# Patient Record
Sex: MALE | Race: WHITE | NOT HISPANIC OR LATINO | Employment: FULL TIME | URBAN - METROPOLITAN AREA
[De-identification: names, ages, dates, MRNs, and addresses within clinical notes are randomized per-mention and may not be internally consistent; named-entity substitution may affect disease eponyms.]

---

## 2023-06-12 ENCOUNTER — TELEPHONE (OUTPATIENT)
Dept: PRIMARY CARE | Facility: CLINIC | Age: 68
End: 2023-06-12
Payer: COMMERCIAL

## 2023-06-12 DIAGNOSIS — N52.9 ERECTILE DYSFUNCTION, UNSPECIFIED ERECTILE DYSFUNCTION TYPE: Primary | ICD-10-CM

## 2023-06-13 RX ORDER — SILDENAFIL 100 MG/1
100 TABLET, FILM COATED ORAL DAILY PRN
Qty: 30 TABLET | Refills: 2 | Status: SHIPPED | OUTPATIENT
Start: 2023-06-13 | End: 2024-05-03 | Stop reason: SDUPTHER

## 2023-07-14 PROBLEM — E55.9 VITAMIN D DEFICIENCY: Status: ACTIVE | Noted: 2023-07-14

## 2023-07-14 PROBLEM — E78.5 HYPERLIPIDEMIA: Status: ACTIVE | Noted: 2023-07-14

## 2023-07-14 PROBLEM — K40.90 RIGHT INGUINAL HERNIA: Status: ACTIVE | Noted: 2023-07-14

## 2023-07-14 PROBLEM — N40.0 BPH WITHOUT URINARY OBSTRUCTION: Status: ACTIVE | Noted: 2023-07-14

## 2023-07-14 PROBLEM — N52.9 ERECTILE DYSFUNCTION: Status: ACTIVE | Noted: 2023-07-14

## 2023-07-14 PROBLEM — I10 HYPERTENSION, ESSENTIAL: Status: ACTIVE | Noted: 2023-07-14

## 2023-07-14 PROBLEM — R53.83 FATIGUE: Status: ACTIVE | Noted: 2023-07-14

## 2023-07-14 RX ORDER — CLOBETASOL PROPIONATE 0.5 MG/G
CREAM TOPICAL
COMMUNITY
Start: 2020-11-03

## 2023-07-27 RX ORDER — SILDENAFIL 100 MG/1
TABLET, FILM COATED ORAL
Qty: 90 TABLET | Refills: 0 | OUTPATIENT
Start: 2023-07-27

## 2023-09-28 NOTE — TELEPHONE ENCOUNTER
Refill    Sildenafil Citrate 100 mg oral tablet (viagra), take 1 tablet daily 1 hour before needed    Pharm: [facility]   [phone]    LR: 8/18/22 30 days 5 refills  LV:  8/18/22  NV: 8/25/23   Albendazole Counseling:  I discussed with the patient the risks of albendazole including but not limited to cytopenia, kidney damage, nausea/vomiting and severe allergy.  The patient understands that this medication is being used in an off-label manner.

## 2024-04-30 ENCOUNTER — TELEPHONE (OUTPATIENT)
Dept: PRIMARY CARE | Facility: CLINIC | Age: 69
End: 2024-04-30
Payer: COMMERCIAL

## 2024-04-30 DIAGNOSIS — N52.9 ERECTILE DYSFUNCTION, UNSPECIFIED ERECTILE DYSFUNCTION TYPE: ICD-10-CM

## 2024-04-30 NOTE — TELEPHONE ENCOUNTER
Pt is calling asking if you can refill his Sildenafil until his appointment on 5/16/24?    REFILL  MEDICATION:     Sildenafil 100 MG; Take 1 tablet once daily as needed for erectile dysfunction.     PHARM: [facility]   PHARM NUMBER: [phone]    LR: 6/13/23     30 tablets with 2 refills   LV: 8/18/22  NV: 5/16/24

## 2024-05-03 RX ORDER — SILDENAFIL 100 MG/1
100 TABLET, FILM COATED ORAL DAILY PRN
Qty: 30 TABLET | Refills: 2 | Status: SHIPPED | OUTPATIENT
Start: 2024-05-03 | End: 2024-05-16 | Stop reason: SDUPTHER

## 2024-05-16 ENCOUNTER — OFFICE VISIT (OUTPATIENT)
Dept: PRIMARY CARE | Facility: CLINIC | Age: 69
End: 2024-05-16
Payer: COMMERCIAL

## 2024-05-16 VITALS
BODY MASS INDEX: 28.31 KG/M2 | OXYGEN SATURATION: 93 % | WEIGHT: 209 LBS | SYSTOLIC BLOOD PRESSURE: 168 MMHG | HEART RATE: 64 BPM | DIASTOLIC BLOOD PRESSURE: 108 MMHG | HEIGHT: 72 IN

## 2024-05-16 DIAGNOSIS — M54.31 SCIATICA OF RIGHT SIDE: ICD-10-CM

## 2024-05-16 DIAGNOSIS — E55.9 VITAMIN D DEFICIENCY: ICD-10-CM

## 2024-05-16 DIAGNOSIS — E29.1 HYPOGONADISM MALE: ICD-10-CM

## 2024-05-16 DIAGNOSIS — R39.198 SLOWING OF URINARY STREAM: ICD-10-CM

## 2024-05-16 DIAGNOSIS — I10 HYPERTENSION, ESSENTIAL: Primary | ICD-10-CM

## 2024-05-16 DIAGNOSIS — Z00.00 WELCOME TO MEDICARE PREVENTIVE VISIT: ICD-10-CM

## 2024-05-16 DIAGNOSIS — E78.5 HYPERLIPIDEMIA, UNSPECIFIED HYPERLIPIDEMIA TYPE: ICD-10-CM

## 2024-05-16 DIAGNOSIS — N40.0 BENIGN PROSTATIC HYPERPLASIA, UNSPECIFIED WHETHER LOWER URINARY TRACT SYMPTOMS PRESENT: ICD-10-CM

## 2024-05-16 DIAGNOSIS — S32.030D COMPRESSION FRACTURE OF L3 VERTEBRA WITH ROUTINE HEALING, SUBSEQUENT ENCOUNTER: ICD-10-CM

## 2024-05-16 DIAGNOSIS — N52.9 ERECTILE DYSFUNCTION, UNSPECIFIED ERECTILE DYSFUNCTION TYPE: ICD-10-CM

## 2024-05-16 PROCEDURE — G0403 EKG FOR INITIAL PREVENT EXAM: HCPCS | Performed by: FAMILY MEDICINE

## 2024-05-16 PROCEDURE — 1170F FXNL STATUS ASSESSED: CPT | Performed by: FAMILY MEDICINE

## 2024-05-16 PROCEDURE — 1160F RVW MEDS BY RX/DR IN RCRD: CPT | Performed by: FAMILY MEDICINE

## 2024-05-16 PROCEDURE — 3077F SYST BP >= 140 MM HG: CPT | Performed by: FAMILY MEDICINE

## 2024-05-16 PROCEDURE — 1159F MED LIST DOCD IN RCRD: CPT | Performed by: FAMILY MEDICINE

## 2024-05-16 PROCEDURE — 1036F TOBACCO NON-USER: CPT | Performed by: FAMILY MEDICINE

## 2024-05-16 PROCEDURE — 3080F DIAST BP >= 90 MM HG: CPT | Performed by: FAMILY MEDICINE

## 2024-05-16 PROCEDURE — 99215 OFFICE O/P EST HI 40 MIN: CPT | Performed by: FAMILY MEDICINE

## 2024-05-16 PROCEDURE — G0402 INITIAL PREVENTIVE EXAM: HCPCS | Performed by: FAMILY MEDICINE

## 2024-05-16 RX ORDER — AMLODIPINE BESYLATE 5 MG/1
5 TABLET ORAL DAILY
Qty: 90 TABLET | Refills: 2 | Status: SHIPPED | OUTPATIENT
Start: 2024-05-16 | End: 2025-02-10

## 2024-05-16 RX ORDER — METHOCARBAMOL 500 MG/1
500 TABLET, FILM COATED ORAL 3 TIMES DAILY PRN
Qty: 40 TABLET | Refills: 3 | Status: SHIPPED | OUTPATIENT
Start: 2024-05-16 | End: 2024-07-08

## 2024-05-16 RX ORDER — SILDENAFIL 100 MG/1
100 TABLET, FILM COATED ORAL DAILY PRN
Qty: 30 TABLET | Refills: 2 | Status: SHIPPED | OUTPATIENT
Start: 2024-05-16 | End: 2025-05-16

## 2024-05-16 ASSESSMENT — ACTIVITIES OF DAILY LIVING (ADL)
DOING_HOUSEWORK: INDEPENDENT
MANAGING_FINANCES: INDEPENDENT
GROCERY_SHOPPING: INDEPENDENT
DRESSING: INDEPENDENT
BATHING: INDEPENDENT

## 2024-05-16 ASSESSMENT — PATIENT HEALTH QUESTIONNAIRE - PHQ9
2. FEELING DOWN, DEPRESSED OR HOPELESS: NOT AT ALL
SUM OF ALL RESPONSES TO PHQ9 QUESTIONS 1 AND 2: 0
1. LITTLE INTEREST OR PLEASURE IN DOING THINGS: NOT AT ALL

## 2024-05-16 ASSESSMENT — ENCOUNTER SYMPTOMS
LOSS OF SENSATION IN FEET: 0
OCCASIONAL FEELINGS OF UNSTEADINESS: 0
DEPRESSION: 0

## 2024-05-16 NOTE — PATIENT INSTRUCTIONS
Hypertension: Discussed importance of good blood pressure control to avoid long-term complications such as heart attack and stroke.  Patient is aware that blood pressure goal is less than 130/80.  Maintaining a regular exercise program and body mass index (BMI) less than 25 as well as a diet lower in carbohydrates will help reach these goals.
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
## 2024-05-17 ENCOUNTER — LAB (OUTPATIENT)
Dept: LAB | Facility: LAB | Age: 69
End: 2024-05-17
Payer: COMMERCIAL

## 2024-05-17 ENCOUNTER — HOSPITAL ENCOUNTER (OUTPATIENT)
Dept: RADIOLOGY | Facility: CLINIC | Age: 69
Discharge: HOME | End: 2024-05-17
Payer: MEDICARE

## 2024-05-17 DIAGNOSIS — R53.83 FATIGUE, UNSPECIFIED TYPE: ICD-10-CM

## 2024-05-17 DIAGNOSIS — E78.5 HYPERLIPIDEMIA, UNSPECIFIED HYPERLIPIDEMIA TYPE: ICD-10-CM

## 2024-05-17 DIAGNOSIS — E29.1 HYPOGONADISM MALE: ICD-10-CM

## 2024-05-17 DIAGNOSIS — N40.0 BENIGN PROSTATIC HYPERPLASIA, UNSPECIFIED WHETHER LOWER URINARY TRACT SYMPTOMS PRESENT: ICD-10-CM

## 2024-05-17 DIAGNOSIS — M54.31 SCIATICA OF RIGHT SIDE: ICD-10-CM

## 2024-05-17 DIAGNOSIS — Z00.00 ENCOUNTER FOR HEALTH MAINTENANCE EXAMINATION: ICD-10-CM

## 2024-05-17 DIAGNOSIS — Z13.220 SCREENING, LIPID: ICD-10-CM

## 2024-05-17 DIAGNOSIS — R39.198 SLOWING OF URINARY STREAM: ICD-10-CM

## 2024-05-17 DIAGNOSIS — E55.9 VITAMIN D DEFICIENCY: ICD-10-CM

## 2024-05-17 DIAGNOSIS — I10 HYPERTENSION, ESSENTIAL: ICD-10-CM

## 2024-05-17 LAB
25(OH)D3 SERPL-MCNC: 25 NG/ML (ref 30–100)
ALBUMIN SERPL BCP-MCNC: 4.5 G/DL (ref 3.4–5)
ALP SERPL-CCNC: 74 U/L (ref 33–136)
ALT SERPL W P-5'-P-CCNC: 48 U/L (ref 10–52)
ANION GAP SERPL CALC-SCNC: 11 MMOL/L (ref 10–20)
APPEARANCE UR: CLEAR
AST SERPL W P-5'-P-CCNC: 35 U/L (ref 9–39)
BILIRUB SERPL-MCNC: 0.8 MG/DL (ref 0–1.2)
BILIRUB UR STRIP.AUTO-MCNC: NEGATIVE MG/DL
BUN SERPL-MCNC: 18 MG/DL (ref 6–23)
CALCIUM SERPL-MCNC: 9.5 MG/DL (ref 8.6–10.3)
CHLORIDE SERPL-SCNC: 104 MMOL/L (ref 98–107)
CHOLEST SERPL-MCNC: 165 MG/DL (ref 0–199)
CHOLESTEROL/HDL RATIO: 3.3
CO2 SERPL-SCNC: 27 MMOL/L (ref 21–32)
COLOR UR: YELLOW
CREAT SERPL-MCNC: 0.73 MG/DL (ref 0.5–1.3)
EGFRCR SERPLBLD CKD-EPI 2021: >90 ML/MIN/1.73M*2
ERYTHROCYTE [DISTWIDTH] IN BLOOD BY AUTOMATED COUNT: 13.2 % (ref 11.5–14.5)
GLUCOSE SERPL-MCNC: 89 MG/DL (ref 74–99)
GLUCOSE UR STRIP.AUTO-MCNC: NEGATIVE MG/DL
HCT VFR BLD AUTO: 48.7 % (ref 41–52)
HDLC SERPL-MCNC: 49.7 MG/DL
HGB BLD-MCNC: 16.3 G/DL (ref 13.5–17.5)
KETONES UR STRIP.AUTO-MCNC: NEGATIVE MG/DL
LDLC SERPL CALC-MCNC: 76 MG/DL
LEUKOCYTE ESTERASE UR QL STRIP.AUTO: NEGATIVE
MCH RBC QN AUTO: 28.2 PG (ref 26–34)
MCHC RBC AUTO-ENTMCNC: 33.5 G/DL (ref 32–36)
MCV RBC AUTO: 84 FL (ref 80–100)
NITRITE UR QL STRIP.AUTO: NEGATIVE
NON HDL CHOLESTEROL: 115 MG/DL (ref 0–149)
NRBC BLD-RTO: 0 /100 WBCS (ref 0–0)
PH UR STRIP.AUTO: 6 [PH]
PLATELET # BLD AUTO: 246 X10*3/UL (ref 150–450)
POTASSIUM SERPL-SCNC: 4.5 MMOL/L (ref 3.5–5.3)
PROT SERPL-MCNC: 7.7 G/DL (ref 6.4–8.2)
PROT UR STRIP.AUTO-MCNC: NEGATIVE MG/DL
PSA SERPL-MCNC: 1.54 NG/ML
RBC # BLD AUTO: 5.79 X10*6/UL (ref 4.5–5.9)
RBC # UR STRIP.AUTO: NEGATIVE /UL
SODIUM SERPL-SCNC: 137 MMOL/L (ref 136–145)
SP GR UR STRIP.AUTO: 1.02
TESTOST SERPL-MCNC: 780 NG/DL (ref 240–1000)
TRIGL SERPL-MCNC: 197 MG/DL (ref 0–149)
TSH SERPL-ACNC: 1.1 MIU/L (ref 0.44–3.98)
UROBILINOGEN UR STRIP.AUTO-MCNC: 2 MG/DL
VLDL: 39 MG/DL (ref 0–40)
WBC # BLD AUTO: 8 X10*3/UL (ref 4.4–11.3)

## 2024-05-17 PROCEDURE — 82306 VITAMIN D 25 HYDROXY: CPT

## 2024-05-17 PROCEDURE — 72100 X-RAY EXAM L-S SPINE 2/3 VWS: CPT | Performed by: RADIOLOGY

## 2024-05-17 PROCEDURE — 85027 COMPLETE CBC AUTOMATED: CPT

## 2024-05-17 PROCEDURE — 36415 COLL VENOUS BLD VENIPUNCTURE: CPT

## 2024-05-17 PROCEDURE — 84153 ASSAY OF PSA TOTAL: CPT

## 2024-05-17 PROCEDURE — 72100 X-RAY EXAM L-S SPINE 2/3 VWS: CPT

## 2024-05-17 PROCEDURE — 84443 ASSAY THYROID STIM HORMONE: CPT

## 2024-05-17 PROCEDURE — 80053 COMPREHEN METABOLIC PANEL: CPT

## 2024-05-17 PROCEDURE — 84403 ASSAY OF TOTAL TESTOSTERONE: CPT

## 2024-05-17 PROCEDURE — 81003 URINALYSIS AUTO W/O SCOPE: CPT

## 2024-05-17 PROCEDURE — 80061 LIPID PANEL: CPT

## 2024-05-22 ENCOUNTER — TELEPHONE (OUTPATIENT)
Dept: PRIMARY CARE | Facility: CLINIC | Age: 69
End: 2024-05-22
Payer: COMMERCIAL

## 2024-05-23 NOTE — TELEPHONE ENCOUNTER
I notified patient of his blood work results, but he also wants his XRAY results.     Dr. Schroeder,   Please see XRAY results then FWD to me to call patient.

## 2024-05-24 DIAGNOSIS — M54.42 ACUTE LEFT-SIDED LOW BACK PAIN WITH LEFT-SIDED SCIATICA: ICD-10-CM

## 2024-05-24 DIAGNOSIS — N20.0 NEPHROLITHIASIS: Primary | ICD-10-CM

## 2024-05-24 NOTE — RESULT ENCOUNTER NOTE
Left detailed VM notifying patient and asked that he call our office back to schedule with urology. I also mailed him a copy of the xray report as he requested.

## 2024-05-28 ENCOUNTER — TELEPHONE (OUTPATIENT)
Dept: PRIMARY CARE | Facility: CLINIC | Age: 69
End: 2024-05-28
Payer: COMMERCIAL

## 2024-05-28 NOTE — RESULT ENCOUNTER NOTE
Patient called back and spoke with Jenniffer. He has multiple questions for Dr. Schroeder. Can you please call him?

## 2024-07-22 DIAGNOSIS — N20.0 RENAL STONE: ICD-10-CM

## 2024-07-22 DIAGNOSIS — N40.0 BPH WITHOUT URINARY OBSTRUCTION: ICD-10-CM

## 2024-07-24 ENCOUNTER — HOSPITAL ENCOUNTER (OUTPATIENT)
Dept: RADIOLOGY | Facility: CLINIC | Age: 69
Discharge: HOME | End: 2024-07-24
Payer: MEDICARE

## 2024-07-24 DIAGNOSIS — N20.0 RENAL STONE: ICD-10-CM

## 2024-07-24 PROCEDURE — 74176 CT ABD & PELVIS W/O CONTRAST: CPT | Performed by: RADIOLOGY

## 2024-07-24 PROCEDURE — 74176 CT ABD & PELVIS W/O CONTRAST: CPT

## 2024-07-30 ENCOUNTER — TELEPHONE (OUTPATIENT)
Dept: PRIMARY CARE | Facility: CLINIC | Age: 69
End: 2024-07-30
Payer: MEDICARE

## 2024-07-30 NOTE — TELEPHONE ENCOUNTER
Patient states he has been having difficulty reaching Dr. Candelaria's office. He said he did a CT scan last week and still has not heard back. He states he is still in a considerable amount of pain and is feeling discouraged

## 2024-08-16 ENCOUNTER — APPOINTMENT (OUTPATIENT)
Dept: PRIMARY CARE | Facility: CLINIC | Age: 69
End: 2024-08-16
Payer: COMMERCIAL

## 2024-08-16 VITALS
OXYGEN SATURATION: 97 % | SYSTOLIC BLOOD PRESSURE: 171 MMHG | BODY MASS INDEX: 28.04 KG/M2 | HEIGHT: 72 IN | DIASTOLIC BLOOD PRESSURE: 91 MMHG | WEIGHT: 207 LBS | HEART RATE: 78 BPM

## 2024-08-16 DIAGNOSIS — M54.31 SCIATICA OF RIGHT SIDE: ICD-10-CM

## 2024-08-16 DIAGNOSIS — N20.0 RENAL CALCULUS, LEFT: ICD-10-CM

## 2024-08-16 DIAGNOSIS — I10 HYPERTENSION, ESSENTIAL: Primary | ICD-10-CM

## 2024-08-16 PROCEDURE — 3077F SYST BP >= 140 MM HG: CPT | Performed by: FAMILY MEDICINE

## 2024-08-16 PROCEDURE — 3008F BODY MASS INDEX DOCD: CPT | Performed by: FAMILY MEDICINE

## 2024-08-16 PROCEDURE — 1159F MED LIST DOCD IN RCRD: CPT | Performed by: FAMILY MEDICINE

## 2024-08-16 PROCEDURE — 1160F RVW MEDS BY RX/DR IN RCRD: CPT | Performed by: FAMILY MEDICINE

## 2024-08-16 PROCEDURE — 1036F TOBACCO NON-USER: CPT | Performed by: FAMILY MEDICINE

## 2024-08-16 PROCEDURE — 99214 OFFICE O/P EST MOD 30 MIN: CPT | Performed by: FAMILY MEDICINE

## 2024-08-16 PROCEDURE — 3080F DIAST BP >= 90 MM HG: CPT | Performed by: FAMILY MEDICINE

## 2024-08-16 ASSESSMENT — PATIENT HEALTH QUESTIONNAIRE - PHQ9
1. LITTLE INTEREST OR PLEASURE IN DOING THINGS: NOT AT ALL
2. FEELING DOWN, DEPRESSED OR HOPELESS: NOT AT ALL
SUM OF ALL RESPONSES TO PHQ9 QUESTIONS 1 & 2: 0

## 2024-08-16 NOTE — PROGRESS NOTES
Subjective     Patient ID: [id number]     Chuy Salazar is a 69 y.o. male who presents for 3 mo follow up on BP .    HPI    HTN: Pt is being prescribed Norvasc. Pt states that his BP has been elevated times 2 readings outside of the office. Pt did not do home BP monitoring due to not having a BP cuff.    His main concern this visit is continued low back and RLE pain:    - Xray of lumbar spine in 5/2024 showed old vertebral compression fractures of L1, L2, and L3 - all unchanged from previous Xrays.  Also with moderate lumbar DJD and a 6 mm left lower pole renal calculus.  - Pt has been prescribed Robaxin. Pt is using Robaxin at night only due to sedative effects. Pt takes 10 aleve in the AM before going to work. Pt is a .  He continues to work full time without restrictions.  - he describes the pain as 10/10.  Ambulation is painful.      Renal Stones: This was an incidental finding of CT. This condition is being managed by Dr. Brambila.    ROS:  denies headache, vision change, tinnitus, chest pain, SOB, B/B incontinence    Objective   BP (!) 171/91   Pulse 78   Ht 1.829 m (6')   Wt 93.9 kg (207 lb)   SpO2 97%   BMI 28.07 kg/m²      Physical Exam:     -Musculoskeletal: Positive patellar reflexes. Pt is unbalanced with heal and toe lifts. Pt favors RLE while ambulating. Sacral iliac tenderness with palpitation.  -CT of Abd/pelvis reviewed w/ pt.    Assessment/Plan     1. Hypertension, essential  - continue Norvasc 5 mg qd  -Lisinopril 10 mg po twice daily added to medication regimen this visit.  -Hypertension: Discussed importance of good blood pressure control to avoid long-term complications such as heart attack and stroke.  Patient is aware that blood pressure goal is less than 130/80.  Maintaining a regular exercise program and body mass index (BMI) less than 25 as well as a diet lower in carbohydrates will help reach these goals.   -Encouraged home BP monitoring.     2. Sciatica of right  side    - Referral to Pain Medicine; Future    3. Renal calculus, left    -Pt to follow up with Dr. Brambila the urologist.          Follow up in 3 months for medical management    I will continue to monitor, evaluate, assess and treat all problems/diagnoses as appropriate and continue to collaborate with specialists.    Contact office or send a  [facility] message with any questions or concerns    Encouraged to sign up with  [facility]  Patient will only be notified of labs that require medical intervention.    Prescriptions will not be filled unless you are compliant with your follow up appointments or have a follow up appointment scheduled as per instruction of your physician. Refills should be requested at the time of your visit.    **Charting was completed using voice recognition technology and may include unintended errors**    Documentation in part by Kathy De La Rosa RN, NP student, [facility]    I saw and evaluated the patient. I personally obtained the key and critical portions of the history and physical exam or was physically present for key and critical portions performed by the nurse practitioner student. I reviewed the documentation and discussed the patient with the nurse practitioner student.  I was directly involved with the history, exam and medical decision making and agree with the medical decision making as documented in the note.      Kris Schroeder DO, JACOB  Senior Attending Physician  [facility]  [id number]  [place] [address] [phone]        Kathy De La Rosa

## 2024-08-18 RX ORDER — LISINOPRIL 10 MG/1
20 TABLET ORAL DAILY
Qty: 90 TABLET | Refills: 2 | Status: SHIPPED | OUTPATIENT
Start: 2024-08-18 | End: 2025-01-15

## 2024-08-22 ENCOUNTER — OFFICE VISIT (OUTPATIENT)
Dept: PAIN MEDICINE | Facility: CLINIC | Age: 69
End: 2024-08-22
Payer: MEDICARE

## 2024-08-22 VITALS
HEART RATE: 61 BPM | HEIGHT: 72 IN | RESPIRATION RATE: 18 BRPM | WEIGHT: 207 LBS | SYSTOLIC BLOOD PRESSURE: 127 MMHG | BODY MASS INDEX: 28.04 KG/M2 | OXYGEN SATURATION: 94 % | TEMPERATURE: 98.2 F | DIASTOLIC BLOOD PRESSURE: 80 MMHG

## 2024-08-22 DIAGNOSIS — M54.40 CHRONIC LOW BACK PAIN WITH SCIATICA, SCIATICA LATERALITY UNSPECIFIED, UNSPECIFIED BACK PAIN LATERALITY: ICD-10-CM

## 2024-08-22 DIAGNOSIS — M54.31 SCIATICA OF RIGHT SIDE: ICD-10-CM

## 2024-08-22 DIAGNOSIS — G89.29 CHRONIC LOW BACK PAIN WITH SCIATICA, SCIATICA LATERALITY UNSPECIFIED, UNSPECIFIED BACK PAIN LATERALITY: ICD-10-CM

## 2024-08-22 DIAGNOSIS — M54.17 LUMBOSACRAL RADICULOPATHY AT L5: Primary | ICD-10-CM

## 2024-08-22 PROCEDURE — 1036F TOBACCO NON-USER: CPT | Performed by: ANESTHESIOLOGY

## 2024-08-22 PROCEDURE — 1125F AMNT PAIN NOTED PAIN PRSNT: CPT | Performed by: ANESTHESIOLOGY

## 2024-08-22 PROCEDURE — 99204 OFFICE O/P NEW MOD 45 MIN: CPT | Performed by: ANESTHESIOLOGY

## 2024-08-22 PROCEDURE — 3008F BODY MASS INDEX DOCD: CPT | Performed by: ANESTHESIOLOGY

## 2024-08-22 PROCEDURE — 1159F MED LIST DOCD IN RCRD: CPT | Performed by: ANESTHESIOLOGY

## 2024-08-22 PROCEDURE — 3074F SYST BP LT 130 MM HG: CPT | Performed by: ANESTHESIOLOGY

## 2024-08-22 PROCEDURE — 99214 OFFICE O/P EST MOD 30 MIN: CPT | Performed by: ANESTHESIOLOGY

## 2024-08-22 PROCEDURE — 3079F DIAST BP 80-89 MM HG: CPT | Performed by: ANESTHESIOLOGY

## 2024-08-22 RX ORDER — GABAPENTIN 300 MG/1
300 CAPSULE ORAL 3 TIMES DAILY
Qty: 90 CAPSULE | Refills: 1 | Status: SHIPPED | OUTPATIENT
Start: 2024-08-22 | End: 2024-09-21

## 2024-08-22 RX ORDER — MELOXICAM 15 MG/1
15 TABLET ORAL DAILY
Qty: 90 TABLET | Refills: 0 | Status: SHIPPED | OUTPATIENT
Start: 2024-08-22 | End: 2024-11-20

## 2024-08-22 ASSESSMENT — ENCOUNTER SYMPTOMS
DEPRESSION: 0
LOSS OF SENSATION IN FEET: 1
OCCASIONAL FEELINGS OF UNSTEADINESS: 1

## 2024-08-22 ASSESSMENT — COLUMBIA-SUICIDE SEVERITY RATING SCALE - C-SSRS
2. HAVE YOU ACTUALLY HAD ANY THOUGHTS OF KILLING YOURSELF?: NO
6. HAVE YOU EVER DONE ANYTHING, STARTED TO DO ANYTHING, OR PREPARED TO DO ANYTHING TO END YOUR LIFE?: NO
1. IN THE PAST MONTH, HAVE YOU WISHED YOU WERE DEAD OR WISHED YOU COULD GO TO SLEEP AND NOT WAKE UP?: NO

## 2024-08-22 ASSESSMENT — PAIN - FUNCTIONAL ASSESSMENT: PAIN_FUNCTIONAL_ASSESSMENT: 0-10

## 2024-08-22 ASSESSMENT — PAIN SCALES - GENERAL
PAINLEVEL: 9
PAINLEVEL_OUTOF10: 9

## 2024-08-22 ASSESSMENT — PATIENT HEALTH QUESTIONNAIRE - PHQ9
SUM OF ALL RESPONSES TO PHQ9 QUESTIONS 1 AND 2: 0
1. LITTLE INTEREST OR PLEASURE IN DOING THINGS: NOT AT ALL
2. FEELING DOWN, DEPRESSED OR HOPELESS: NOT AT ALL

## 2024-08-22 ASSESSMENT — PAIN DESCRIPTION - DESCRIPTORS: DESCRIPTORS: ACHING
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
## 2024-08-22 NOTE — PROGRESS NOTES
Npv, referred by dr dumont his pcp. Right leg pain that starts at the waist to ankle. Started around May, no trauma noted. Patient had xray done. Started on robaxin. Has not had physicial therapy.

## 2024-08-29 ENCOUNTER — HOSPITAL ENCOUNTER (OUTPATIENT)
Dept: RADIOLOGY | Facility: CLINIC | Age: 69
Discharge: HOME | End: 2024-08-29
Payer: MEDICARE

## 2024-08-29 DIAGNOSIS — M54.17 LUMBOSACRAL RADICULOPATHY AT L5: ICD-10-CM

## 2024-08-29 PROCEDURE — A9575 INJ GADOTERATE MEGLUMI 0.1ML: HCPCS | Performed by: ANESTHESIOLOGY

## 2024-08-29 PROCEDURE — 2550000001 HC RX 255 CONTRASTS: Performed by: ANESTHESIOLOGY

## 2024-08-29 PROCEDURE — 72158 MRI LUMBAR SPINE W/O & W/DYE: CPT

## 2024-08-29 RX ORDER — GADOTERATE MEGLUMINE 376.9 MG/ML
20 INJECTION INTRAVENOUS
Status: COMPLETED | OUTPATIENT
Start: 2024-08-29 | End: 2024-08-29

## 2024-09-12 ENCOUNTER — OFFICE VISIT (OUTPATIENT)
Dept: PAIN MEDICINE | Facility: CLINIC | Age: 69
End: 2024-09-12
Payer: MEDICARE

## 2024-09-12 VITALS
RESPIRATION RATE: 18 BRPM | HEART RATE: 82 BPM | DIASTOLIC BLOOD PRESSURE: 89 MMHG | BODY MASS INDEX: 28.04 KG/M2 | SYSTOLIC BLOOD PRESSURE: 138 MMHG | OXYGEN SATURATION: 94 % | TEMPERATURE: 97.9 F | WEIGHT: 207 LBS | HEIGHT: 72 IN

## 2024-09-12 DIAGNOSIS — M54.40 CHRONIC LOW BACK PAIN WITH SCIATICA, SCIATICA LATERALITY UNSPECIFIED, UNSPECIFIED BACK PAIN LATERALITY: ICD-10-CM

## 2024-09-12 DIAGNOSIS — M48.062 LUMBAR STENOSIS WITH NEUROGENIC CLAUDICATION: ICD-10-CM

## 2024-09-12 DIAGNOSIS — G89.29 CHRONIC LOW BACK PAIN WITH SCIATICA, SCIATICA LATERALITY UNSPECIFIED, UNSPECIFIED BACK PAIN LATERALITY: ICD-10-CM

## 2024-09-12 DIAGNOSIS — S32.030D COMPRESSION FRACTURE OF L3 VERTEBRA WITH ROUTINE HEALING: Primary | ICD-10-CM

## 2024-09-12 DIAGNOSIS — M48.061 NEUROFORAMINAL STENOSIS OF LUMBAR SPINE: ICD-10-CM

## 2024-09-12 DIAGNOSIS — M54.16 LUMBAR RADICULOPATHY, CHRONIC: ICD-10-CM

## 2024-09-12 PROBLEM — M54.17 LUMBOSACRAL RADICULOPATHY AT L5: Status: ACTIVE | Noted: 2024-09-12

## 2024-09-12 PROCEDURE — 1036F TOBACCO NON-USER: CPT | Performed by: ANESTHESIOLOGY

## 2024-09-12 PROCEDURE — 3008F BODY MASS INDEX DOCD: CPT | Performed by: ANESTHESIOLOGY

## 2024-09-12 PROCEDURE — 1125F AMNT PAIN NOTED PAIN PRSNT: CPT | Performed by: ANESTHESIOLOGY

## 2024-09-12 PROCEDURE — 99213 OFFICE O/P EST LOW 20 MIN: CPT | Performed by: ANESTHESIOLOGY

## 2024-09-12 PROCEDURE — 1160F RVW MEDS BY RX/DR IN RCRD: CPT | Performed by: ANESTHESIOLOGY

## 2024-09-12 PROCEDURE — 3075F SYST BP GE 130 - 139MM HG: CPT | Performed by: ANESTHESIOLOGY

## 2024-09-12 PROCEDURE — 1159F MED LIST DOCD IN RCRD: CPT | Performed by: ANESTHESIOLOGY

## 2024-09-12 PROCEDURE — 3079F DIAST BP 80-89 MM HG: CPT | Performed by: ANESTHESIOLOGY

## 2024-09-12 ASSESSMENT — ENCOUNTER SYMPTOMS
OCCASIONAL FEELINGS OF UNSTEADINESS: 0
LOSS OF SENSATION IN FEET: 0
DEPRESSION: 0

## 2024-09-12 ASSESSMENT — COLUMBIA-SUICIDE SEVERITY RATING SCALE - C-SSRS
6. HAVE YOU EVER DONE ANYTHING, STARTED TO DO ANYTHING, OR PREPARED TO DO ANYTHING TO END YOUR LIFE?: NO
1. IN THE PAST MONTH, HAVE YOU WISHED YOU WERE DEAD OR WISHED YOU COULD GO TO SLEEP AND NOT WAKE UP?: NO
2. HAVE YOU ACTUALLY HAD ANY THOUGHTS OF KILLING YOURSELF?: NO

## 2024-09-12 ASSESSMENT — PAIN SCALES - GENERAL: PAINLEVEL: 10-WORST PAIN EVER

## 2024-09-12 NOTE — PROGRESS NOTES
Subjective   Patient ID: Chuy Salazar is a 69 y.o. male who presents lumbosacral radiculopathy involving his right leg.  He has got low back pain and L5-S1 radicular symptoms secondary to a bulging disc on MRI which was done on 8/22/2024 at L3-4 L4-5 L5-S1.  He has severe nerve root impingement of the right S1 nerve root as well as severe spinal stenosis at the L4-L5 level which narrows the spinal canal down to 5 mm maximally.  HPI  Chronic low back pain with radicular symptoms secondary to bulging disc; advanced facet arthrosis; old vertebral compression fractures L1-L2-L3  Review of Systems  Unremarkable except chief complaint which she rates his pain to be 10 on a 1-10 scale involving his right lower extremity  Objective   Physical Exam  Gen. appearance:  Patient's alert and oriented ×3 ;cooperative mild to moderate distress  Heart: Regular rate and rhythm  Lungs: Clear to auscultation  Abdomen: Soft nontender  Neuro: Cranial nerves II -XII intact; DTR: +2 over 4 biceps triceps brachial radialis patellar and Achilles  Spinal exam: Positive paraspinal tenderness noted bilaterally L4 5 L5-S1 with flexion extension and rotation/no bony abnormalities  Musculoskeletal:  Upper and lower extremity strength was 4/5 bilaterally  :  deferred  Skin: Warm and dry      Assessment/Plan   Diagnoses and all orders for this visit:  Compression fracture of L3 vertebra with routine healing  Chronic low back pain with sciatica, sciatica laterality unspecified, unspecified back pain laterality  Neuroforaminal stenosis of lumbar spine  Lumbar radiculopathy, chronic  -     FL pain management; Future  -     Transforaminal; Future  Lumbar stenosis with neurogenic claudication  -     FL pain management; Future  -     Transforaminal; Future  Patient was told to be n.p.o. for 6 hours prior to scheduled procedure which will be in 2 weeks on September 25, 2024    Nghia Armas DO 09/12/24 2:19 PM

## 2024-09-12 NOTE — PROGRESS NOTES
Pain #10/10 to his right buttocks down the leg to the knee to the ankle.  Pain increases with walking and standing.  Pain is constant.  Takes Advil and Aleve daily.  Uses ice packs.

## 2024-09-12 NOTE — PROGRESS NOTES
Pain #10/10 right buttocks, knee, leg down to the ankle.  Pain is constant.  Pain increases with walking and standing.  Taking Advil or Aleve,  ice packs.  Subjective   Chuy Salazar is a 69 y.o. male who returns for follow-up of chronic pain.   Analgesia: {CP ANALGESIA:18901} which is {CP STABLE:27674}.  Activity Level: {CP ACTIVITY LEVEL:37322}    Objective   Physical Exam     Spine stimulator: {spine stimulator:59366}    Assessment/Plan   {Assess/Plan SmartLinks (Optional):29957}

## 2024-09-25 ENCOUNTER — HOSPITAL ENCOUNTER (OUTPATIENT)
Dept: PAIN MEDICINE | Facility: CLINIC | Age: 69
Discharge: HOME | End: 2024-09-25
Payer: MEDICARE

## 2024-09-25 VITALS
TEMPERATURE: 97.7 F | BODY MASS INDEX: 28.44 KG/M2 | HEIGHT: 72 IN | DIASTOLIC BLOOD PRESSURE: 80 MMHG | SYSTOLIC BLOOD PRESSURE: 128 MMHG | WEIGHT: 210 LBS | RESPIRATION RATE: 18 BRPM | HEART RATE: 59 BPM | OXYGEN SATURATION: 97 %

## 2024-09-25 DIAGNOSIS — M48.062 LUMBAR STENOSIS WITH NEUROGENIC CLAUDICATION: ICD-10-CM

## 2024-09-25 DIAGNOSIS — M54.17 LUMBOSACRAL RADICULOPATHY AT L5: ICD-10-CM

## 2024-09-25 DIAGNOSIS — M48.061 NEUROFORAMINAL STENOSIS OF LUMBAR SPINE: Primary | ICD-10-CM

## 2024-09-25 DIAGNOSIS — M54.16 LUMBAR RADICULOPATHY, CHRONIC: ICD-10-CM

## 2024-09-25 PROCEDURE — 64484 NJX AA&/STRD TFRM EPI L/S EA: CPT | Performed by: ANESTHESIOLOGY

## 2024-09-25 PROCEDURE — 99152 MOD SED SAME PHYS/QHP 5/>YRS: CPT | Performed by: ANESTHESIOLOGY

## 2024-09-25 PROCEDURE — 99153 MOD SED SAME PHYS/QHP EA: CPT | Performed by: ANESTHESIOLOGY

## 2024-09-25 PROCEDURE — 64483 NJX AA&/STRD TFRM EPI L/S 1: CPT | Performed by: ANESTHESIOLOGY

## 2024-09-25 PROCEDURE — 2500000004 HC RX 250 GENERAL PHARMACY W/ HCPCS (ALT 636 FOR OP/ED): Performed by: ANESTHESIOLOGY

## 2024-09-25 PROCEDURE — 2550000001 HC RX 255 CONTRASTS: Performed by: ANESTHESIOLOGY

## 2024-09-25 PROCEDURE — 2500000005 HC RX 250 GENERAL PHARMACY W/O HCPCS: Performed by: ANESTHESIOLOGY

## 2024-09-25 RX ORDER — MIDAZOLAM HYDROCHLORIDE 1 MG/ML
1 INJECTION, SOLUTION INTRAMUSCULAR; INTRAVENOUS
Status: COMPLETED | OUTPATIENT
Start: 2024-09-25 | End: 2024-09-25

## 2024-09-25 RX ORDER — FENTANYL CITRATE 50 UG/ML
50 INJECTION, SOLUTION INTRAMUSCULAR; INTRAVENOUS AS NEEDED
Status: SHIPPED | OUTPATIENT
Start: 2024-09-25 | End: 2024-09-25

## 2024-09-25 RX ORDER — LIDOCAINE HYDROCHLORIDE 20 MG/ML
INJECTION, SOLUTION EPIDURAL; INFILTRATION; INTRACAUDAL; PERINEURAL AS NEEDED
Status: COMPLETED | OUTPATIENT
Start: 2024-09-25 | End: 2024-09-25

## 2024-09-25 RX ORDER — BETAMETHASONE SODIUM PHOSPHATE AND BETAMETHASONE ACETATE 3; 3 MG/ML; MG/ML
INJECTION, SUSPENSION INTRA-ARTICULAR; INTRALESIONAL; INTRAMUSCULAR; SOFT TISSUE AS NEEDED
Status: COMPLETED | OUTPATIENT
Start: 2024-09-25 | End: 2024-09-25

## 2024-09-25 ASSESSMENT — PAIN - FUNCTIONAL ASSESSMENT
PAIN_FUNCTIONAL_ASSESSMENT: 0-10

## 2024-09-25 ASSESSMENT — ENCOUNTER SYMPTOMS
LOSS OF SENSATION IN FEET: 0
OCCASIONAL FEELINGS OF UNSTEADINESS: 1
DEPRESSION: 0

## 2024-09-25 ASSESSMENT — PAIN SCALES - GENERAL
PAINLEVEL_OUTOF10: 0 - NO PAIN
PAINLEVEL_OUTOF10: 9
PAINLEVEL_OUTOF10: 0 - NO PAIN
PAINLEVEL_OUTOF10: 0 - NO PAIN

## 2024-09-27 ENCOUNTER — LAB (OUTPATIENT)
Dept: LAB | Facility: LAB | Age: 69
End: 2024-09-27
Payer: MEDICARE

## 2024-09-27 DIAGNOSIS — N40.0 BPH WITHOUT URINARY OBSTRUCTION: ICD-10-CM

## 2024-09-27 PROCEDURE — 88112 CYTOPATH CELL ENHANCE TECH: CPT | Performed by: PATHOLOGY

## 2024-09-27 PROCEDURE — 87086 URINE CULTURE/COLONY COUNT: CPT

## 2024-09-27 PROCEDURE — 88112 CYTOPATH CELL ENHANCE TECH: CPT

## 2024-09-29 LAB — BACTERIA UR CULT: NO GROWTH

## 2024-09-30 ENCOUNTER — APPOINTMENT (OUTPATIENT)
Dept: UROLOGY | Facility: CLINIC | Age: 69
End: 2024-09-30
Payer: MEDICARE

## 2024-09-30 VITALS
BODY MASS INDEX: 28.07 KG/M2 | HEIGHT: 72 IN | RESPIRATION RATE: 18 BRPM | WEIGHT: 207.23 LBS | HEART RATE: 86 BPM | DIASTOLIC BLOOD PRESSURE: 84 MMHG | SYSTOLIC BLOOD PRESSURE: 166 MMHG

## 2024-09-30 DIAGNOSIS — N20.0 NEPHROLITHIASIS: ICD-10-CM

## 2024-09-30 DIAGNOSIS — M54.42 ACUTE LEFT-SIDED LOW BACK PAIN WITH LEFT-SIDED SCIATICA: ICD-10-CM

## 2024-09-30 PROCEDURE — 1160F RVW MEDS BY RX/DR IN RCRD: CPT | Performed by: UROLOGY

## 2024-09-30 PROCEDURE — 3079F DIAST BP 80-89 MM HG: CPT | Performed by: UROLOGY

## 2024-09-30 PROCEDURE — 3008F BODY MASS INDEX DOCD: CPT | Performed by: UROLOGY

## 2024-09-30 PROCEDURE — 3077F SYST BP >= 140 MM HG: CPT | Performed by: UROLOGY

## 2024-09-30 PROCEDURE — 1159F MED LIST DOCD IN RCRD: CPT | Performed by: UROLOGY

## 2024-09-30 PROCEDURE — 1036F TOBACCO NON-USER: CPT | Performed by: UROLOGY

## 2024-09-30 PROCEDURE — 99204 OFFICE O/P NEW MOD 45 MIN: CPT | Performed by: UROLOGY

## 2024-09-30 PROCEDURE — 1126F AMNT PAIN NOTED NONE PRSNT: CPT | Performed by: UROLOGY

## 2024-09-30 ASSESSMENT — ENCOUNTER SYMPTOMS
DIFFICULTY URINATING: 0
HEMATURIA: 0
DYSURIA: 0
FREQUENCY: 0

## 2024-09-30 ASSESSMENT — PAIN SCALES - GENERAL: PAINLEVEL: 0-NO PAIN

## 2024-09-30 NOTE — PROGRESS NOTES
"69-year-old white male referred by Dr. Kris Schroeder for \"nephrolithiasis and left lower back pain.\"  Patient has been treated for right sciatic pain.  Spine films show a 6 mm left lower pole stone.  He suffers from BPH and erectile dysfunction.  PSA is normal at 1.54 and urinalysis is negative.  Patient is employed as a .  No family history of prostate or breast cancer.  The patient has never smoked cigarettes.    July 31, 2024, telephone call, renal colic CAT scan identifies 2 separate 7 mm left lower pole renal calculi which are nonobstructing.    September 30, 2024, patient arrives alone.  We had a full discussion regarding stone disease.  I personally reviewed his CAT scan images with him on my computer monitor.  He was able to easily identify the 2 separate 7 mm stones in his left midpole renal.  He wishes to return in February with the plan for ESWL with a 6 Spanish by 26 cm double-J ureteral stent on the left side for April.  He understands it with a large stone burden he may require 2 sessions.    PLAN:    1.  Patient wishes to return in February 2025 to discuss potential ESWL with ureteral stent for April 2025.  "

## 2024-09-30 NOTE — LETTER
"September 30, 2024     Kris Schroeder DO  [address]  [address]  [place] [address]    Patient: Chuy Salazar   YOB: 1955   Date of Visit: 9/30/2024       Dear Dr. Kris Schroeder, :    Thank you for referring Chuy Salazar to me for evaluation. Below are my notes for this consultation.  If you have questions, please do not hesitate to call me. I look forward to following your patient along with you.       Sincerely,     Andrea Brambila MD      CC: No Recipients  ______________________________________________________________________________________    69-year-old white male referred by Dr. Kris Schroeder for \"nephrolithiasis and left lower back pain.\"  Patient has been treated for right sciatic pain.  Spine films show a 6 mm left lower pole stone.  He suffers from BPH and erectile dysfunction.  PSA is normal at 1.54 and urinalysis is negative.  Patient is employed as a .  No family history of prostate or breast cancer.  The patient has never smoked cigarettes.    July 31, 2024, telephone call, renal colic CAT scan identifies 2 separate 7 mm left lower pole renal calculi which are nonobstructing.    September 30, 2024, patient arrives alone.  We had a full discussion regarding stone disease.  I personally reviewed his CAT scan images with him on my computer monitor.  He was able to easily identify the 2 separate 7 mm stones in his left midpole renal.  He wishes to return in February with the plan for ESWL with a 6 Bahamian by 26 cm double-J ureteral stent on the left side for April.  He understands it with a large stone burden he may require 2 sessions.    PLAN:    1.  Patient wishes to return in February 2025 to discuss potential ESWL with ureteral stent for April 2025.     "

## 2024-09-30 NOTE — OP NOTE
Date: 2024  OR Location: [facility] NON-OR PROCEDURES    Name: Chuy Salazar, : 1955, Age: 69 y.o., MRN: [id number], Sex: male    Diagnosis lumbosacral radiculopathy M54.16

## 2024-09-30 NOTE — PROGRESS NOTES
Patient denies any pain today. Patient has not had any recent surgeries or hospital admits. Patient denies any concerns about falling or safety. Patient has no urinary issues. CV    Review of Systems   Genitourinary:  Negative for decreased urine volume, difficulty urinating, dysuria, enuresis, frequency, hematuria, penile pain, penile swelling, scrotal swelling and urgency.   All other systems reviewed and are negative.

## 2024-09-30 NOTE — PATIENT INSTRUCTIONS
It was very nice to meet you today.  We had a full discussion regarding stone disease.  I was able to show you the CAT scan images on my computer monitor in the examination room.  You were able to easily identify the 2 separate 7 mm stones in your left kidney.  You do wish to proceed with shockwave lithotripsy, however, you would like to return next year and schedule a date potentially from April.  You understand with a large stone burden you may require more than 1 session.

## 2024-10-01 NOTE — OP NOTE
Date: 2024  OR Location: [facility] NON-OR PROCEDURES    Name: Chuy Salazar, : 1955, Age: 69 y.o., MRN: [id number], Sex: male    Diagnosis :lumbosacral radiculopathy M54.16   Preprocedure;  Patient's airway was reevaluated preoperatively prior to receiving IV conscious sedation.  No anatomical adverse airway conditions were noted.  A Mallampati score of 2 was assigned to the patient.  ASA : II      Patient has no changes in health medical management or allergies since last visit.    Preop diagnosis: Lumbosacral radiculopathy  Postop diagnosis: Same  Operation performed:  #1  Right L5 and S1 transforaminal epidural steroid injection with fluoroscopic guidance.   #2 epidurography    Procedures   ANESTHESIA: IV conscious sedation  ESTIMATED BLOOD LOSS: None  COMPLICATIONS: None  PROCEDURE: A 22-gauge IV was started in the patient's left hand. The patient was taken to the operating room, placed in the prone position where sterile prep and drapes were done. Supplemental oxygen was given to the patient at 2 L per minute. Blood pressure and pulse ox remained stable throughout the case. Using fluoroscopic guidance, a single 25-gauge 3-1/2 inch spinal needle was inserted into the neural foramen at right L5 and an S1.  A cross table lateral identified the needle tip at the vertebral body. Aspiration was negative of cerebrospinal fluid or blood.  One mL of Omnipaque 300 mg contrast media was injected into each spinal needle. The L5 and S1 nerve roots and epidural space was visualized in both AP and each lateral views with the fluoroscope. One mL of 2% xylocaine MPF and 6 mg of Celestone was injected into each of the 2 needles. The needles were then removed and sterile bandage was applied along with Neosporin ointment to the puncture sites. The patient was taken to the recovery room with no neurologic deficits or pain noted. The patient is scheduled for a follow-up visit.    The patient tolerated the procedure very well  and was transferred to the Recovery Room in stable condition.  The patient had stable resolution of symptoms.  Patient to follow-up in the Pain Management Clinic as scheduled.

## 2024-10-02 LAB
LABORATORY COMMENT REPORT: NORMAL
LABORATORY COMMENT REPORT: NORMAL
PATH REPORT.FINAL DX SPEC: NORMAL
PATH REPORT.GROSS SPEC: NORMAL
PATH REPORT.RELEVANT HX SPEC: NORMAL
PATH REPORT.TOTAL CANCER: NORMAL

## 2024-10-24 ENCOUNTER — OFFICE VISIT (OUTPATIENT)
Dept: PAIN MEDICINE | Facility: CLINIC | Age: 69
End: 2024-10-24
Payer: MEDICARE

## 2024-10-24 VITALS
BODY MASS INDEX: 28.04 KG/M2 | SYSTOLIC BLOOD PRESSURE: 165 MMHG | HEIGHT: 72 IN | DIASTOLIC BLOOD PRESSURE: 88 MMHG | HEART RATE: 73 BPM | TEMPERATURE: 97.3 F | OXYGEN SATURATION: 95 % | RESPIRATION RATE: 18 BRPM | WEIGHT: 207 LBS

## 2024-10-24 DIAGNOSIS — M48.061 NEUROFORAMINAL STENOSIS OF LUMBAR SPINE: ICD-10-CM

## 2024-10-24 DIAGNOSIS — M48.062 LUMBAR STENOSIS WITH NEUROGENIC CLAUDICATION: ICD-10-CM

## 2024-10-24 DIAGNOSIS — M54.16 LUMBAR RADICULOPATHY, CHRONIC: Primary | ICD-10-CM

## 2024-10-24 PROCEDURE — 1036F TOBACCO NON-USER: CPT | Performed by: ANESTHESIOLOGY

## 2024-10-24 PROCEDURE — 1159F MED LIST DOCD IN RCRD: CPT | Performed by: ANESTHESIOLOGY

## 2024-10-24 PROCEDURE — 1160F RVW MEDS BY RX/DR IN RCRD: CPT | Performed by: ANESTHESIOLOGY

## 2024-10-24 PROCEDURE — 99213 OFFICE O/P EST LOW 20 MIN: CPT | Performed by: ANESTHESIOLOGY

## 2024-10-24 PROCEDURE — 1125F AMNT PAIN NOTED PAIN PRSNT: CPT | Performed by: ANESTHESIOLOGY

## 2024-10-24 PROCEDURE — 3077F SYST BP >= 140 MM HG: CPT | Performed by: ANESTHESIOLOGY

## 2024-10-24 PROCEDURE — 3079F DIAST BP 80-89 MM HG: CPT | Performed by: ANESTHESIOLOGY

## 2024-10-24 PROCEDURE — 3008F BODY MASS INDEX DOCD: CPT | Performed by: ANESTHESIOLOGY

## 2024-10-24 ASSESSMENT — COLUMBIA-SUICIDE SEVERITY RATING SCALE - C-SSRS
2. HAVE YOU ACTUALLY HAD ANY THOUGHTS OF KILLING YOURSELF?: NO
1. IN THE PAST MONTH, HAVE YOU WISHED YOU WERE DEAD OR WISHED YOU COULD GO TO SLEEP AND NOT WAKE UP?: NO
6. HAVE YOU EVER DONE ANYTHING, STARTED TO DO ANYTHING, OR PREPARED TO DO ANYTHING TO END YOUR LIFE?: NO

## 2024-10-24 ASSESSMENT — ENCOUNTER SYMPTOMS
LOSS OF SENSATION IN FEET: 0
DEPRESSION: 0
OCCASIONAL FEELINGS OF UNSTEADINESS: 1

## 2024-10-24 ASSESSMENT — PAIN SCALES - GENERAL: PAINLEVEL_OUTOF10: 5

## 2024-10-24 NOTE — H&P (VIEW-ONLY)
Subjective   Patient ID: Chuy Salazar is a 69 y.o. male who presents for lumbosacral radiculopathy .  Patient's Pain involves his right lower extremity secondary to bulging disc at L5 with L5 and S1 nerve root impingement on MRI which was done on 8/22/2024.  He also has severe spinal canal stenosis at the L4-L5 level.  His transforaminal epidural steroid injection which was done on 9/25/2024 gave him some 50% sustained relief up until now.  His right leg is still problematic and is requesting a repeat procedure..  HPI  Lumbosacral radiculopathy secondary to neuroforaminal stenosis L5-S1 ; bulging disc L5 4 5 L5-S1 on the right ; history of vertebral compression fracture L3\   Review of Systems  Unremarkable except chief complaint of lumbosacral radiculopathy  Objective   Physical Exam  Gen. appearance:  Patient's alert and oriented ×3 ;cooperative mild to moderate distress  Heart: Regular rate and rhythm  Lungs: Clear to auscultation  Abdomen: Soft nontender  Neuro: Cranial nerves II -XII intact; DTR: +2 over 4 biceps triceps brachial radialis patellar and Achilles  Spinal exam: Positive paraspinal tenderness noted bilaterally L4 5 L5-S1 with flexion extension and rotation/no bony abnormalities  Musculoskeletal:  Upper and lower extremity strength was 4/5 bilaterally  :  deferred  Skin: Warm and dry        Assessment/Plan   Diagnoses and all orders for this visit:  Lumbar radiculopathy, chronic  -     FL pain management; Future  -     Transforaminal; Future  Lumbar stenosis with neurogenic claudication  -     Follow Up In Pain Medicine  -     Transforaminal; Future  Neuroforaminal stenosis of lumbar spine  -     Follow Up In Pain Medicine  -     FL pain management; Future  -     Transforaminal; Future         Nghia Armas DO 10/24/24 9:10 AM

## 2024-10-24 NOTE — PROGRESS NOTES
Pain #5/10 to his lower back and right leg.  9-25-24 had a right TF which reduced his pain by 50-70%.  States the throbbing has gone away and can sleep better.  Takes Advil for pain which helps him to function.

## 2024-11-07 ENCOUNTER — HOSPITAL ENCOUNTER (OUTPATIENT)
Dept: PAIN MEDICINE | Facility: CLINIC | Age: 69
Discharge: HOME | End: 2024-11-07
Payer: MEDICARE

## 2024-11-07 VITALS
HEART RATE: 61 BPM | BODY MASS INDEX: 27.09 KG/M2 | OXYGEN SATURATION: 96 % | TEMPERATURE: 97.9 F | HEIGHT: 72 IN | SYSTOLIC BLOOD PRESSURE: 112 MMHG | DIASTOLIC BLOOD PRESSURE: 65 MMHG | RESPIRATION RATE: 18 BRPM | WEIGHT: 200 LBS

## 2024-11-07 DIAGNOSIS — M54.16 LUMBAR RADICULOPATHY, CHRONIC: ICD-10-CM

## 2024-11-07 DIAGNOSIS — M48.062 LUMBAR STENOSIS WITH NEUROGENIC CLAUDICATION: ICD-10-CM

## 2024-11-07 DIAGNOSIS — M48.061 NEUROFORAMINAL STENOSIS OF LUMBAR SPINE: ICD-10-CM

## 2024-11-07 DIAGNOSIS — M54.40 CHRONIC LOW BACK PAIN WITH SCIATICA, SCIATICA LATERALITY UNSPECIFIED, UNSPECIFIED BACK PAIN LATERALITY: ICD-10-CM

## 2024-11-07 DIAGNOSIS — G89.29 CHRONIC LOW BACK PAIN WITH SCIATICA, SCIATICA LATERALITY UNSPECIFIED, UNSPECIFIED BACK PAIN LATERALITY: ICD-10-CM

## 2024-11-07 PROCEDURE — 64483 NJX AA&/STRD TFRM EPI L/S 1: CPT | Performed by: ANESTHESIOLOGY

## 2024-11-07 PROCEDURE — 99152 MOD SED SAME PHYS/QHP 5/>YRS: CPT | Performed by: ANESTHESIOLOGY

## 2024-11-07 PROCEDURE — 99153 MOD SED SAME PHYS/QHP EA: CPT | Performed by: ANESTHESIOLOGY

## 2024-11-07 PROCEDURE — 64484 NJX AA&/STRD TFRM EPI L/S EA: CPT | Performed by: ANESTHESIOLOGY

## 2024-11-07 PROCEDURE — 2500000005 HC RX 250 GENERAL PHARMACY W/O HCPCS: Performed by: ANESTHESIOLOGY

## 2024-11-07 PROCEDURE — 2550000001 HC RX 255 CONTRASTS: Performed by: ANESTHESIOLOGY

## 2024-11-07 PROCEDURE — 2500000004 HC RX 250 GENERAL PHARMACY W/ HCPCS (ALT 636 FOR OP/ED): Performed by: ANESTHESIOLOGY

## 2024-11-07 RX ORDER — MIDAZOLAM HYDROCHLORIDE 1 MG/ML
2 INJECTION, SOLUTION INTRAMUSCULAR; INTRAVENOUS
Status: COMPLETED | OUTPATIENT
Start: 2024-11-07 | End: 2024-11-07

## 2024-11-07 RX ORDER — LIDOCAINE HYDROCHLORIDE 20 MG/ML
INJECTION, SOLUTION EPIDURAL; INFILTRATION; INTRACAUDAL; PERINEURAL AS NEEDED
Status: COMPLETED | OUTPATIENT
Start: 2024-11-07 | End: 2024-11-07

## 2024-11-07 RX ORDER — FENTANYL CITRATE 50 UG/ML
50 INJECTION, SOLUTION INTRAMUSCULAR; INTRAVENOUS AS NEEDED
Status: SHIPPED | OUTPATIENT
Start: 2024-11-07 | End: 2024-11-07

## 2024-11-07 RX ORDER — BETAMETHASONE SODIUM PHOSPHATE AND BETAMETHASONE ACETATE 3; 3 MG/ML; MG/ML
INJECTION, SUSPENSION INTRA-ARTICULAR; INTRALESIONAL; INTRAMUSCULAR; SOFT TISSUE AS NEEDED
Status: COMPLETED | OUTPATIENT
Start: 2024-11-07 | End: 2024-11-07

## 2024-11-07 RX ORDER — MIDAZOLAM HYDROCHLORIDE 1 MG/ML
1 INJECTION, SOLUTION INTRAMUSCULAR; INTRAVENOUS
Status: COMPLETED | OUTPATIENT
Start: 2024-11-07 | End: 2024-11-07

## 2024-11-07 ASSESSMENT — PAIN - FUNCTIONAL ASSESSMENT
PAIN_FUNCTIONAL_ASSESSMENT: 0-10

## 2024-11-07 ASSESSMENT — PAIN SCALES - GENERAL
PAINLEVEL_OUTOF10: 0 - NO PAIN
PAINLEVEL_OUTOF10: 5 - MODERATE PAIN

## 2024-11-07 ASSESSMENT — ENCOUNTER SYMPTOMS
OCCASIONAL FEELINGS OF UNSTEADINESS: 0
DEPRESSION: 0
LOSS OF SENSATION IN FEET: 1

## 2024-11-07 NOTE — Clinical Note
Patient tolerated the procedure well and is comfortable with no complaints of pain, RASS =_2. Vital signs stable. Arousable prior to transport. Patient transported to PACU Phase I via stretcher. Handoff completed.

## 2024-11-21 ENCOUNTER — APPOINTMENT (OUTPATIENT)
Dept: PRIMARY CARE | Facility: CLINIC | Age: 69
End: 2024-11-21
Payer: MEDICARE

## 2024-12-02 ENCOUNTER — APPOINTMENT (OUTPATIENT)
Dept: PRIMARY CARE | Facility: CLINIC | Age: 69
End: 2024-12-02
Payer: MEDICARE

## 2024-12-02 VITALS
BODY MASS INDEX: 27.77 KG/M2 | HEIGHT: 72 IN | WEIGHT: 205 LBS | SYSTOLIC BLOOD PRESSURE: 131 MMHG | OXYGEN SATURATION: 97 % | HEART RATE: 87 BPM | DIASTOLIC BLOOD PRESSURE: 85 MMHG

## 2024-12-02 DIAGNOSIS — M54.41 CHRONIC RIGHT-SIDED LOW BACK PAIN WITH RIGHT-SIDED SCIATICA: ICD-10-CM

## 2024-12-02 DIAGNOSIS — G89.29 CHRONIC RIGHT-SIDED LOW BACK PAIN WITH RIGHT-SIDED SCIATICA: ICD-10-CM

## 2024-12-02 DIAGNOSIS — I10 HYPERTENSION, ESSENTIAL: Primary | ICD-10-CM

## 2024-12-02 DIAGNOSIS — N20.0 NEPHROLITHIASIS: ICD-10-CM

## 2024-12-02 PROCEDURE — 3008F BODY MASS INDEX DOCD: CPT | Performed by: FAMILY MEDICINE

## 2024-12-02 PROCEDURE — 99213 OFFICE O/P EST LOW 20 MIN: CPT | Performed by: FAMILY MEDICINE

## 2024-12-02 PROCEDURE — 1159F MED LIST DOCD IN RCRD: CPT | Performed by: FAMILY MEDICINE

## 2024-12-02 PROCEDURE — 3075F SYST BP GE 130 - 139MM HG: CPT | Performed by: FAMILY MEDICINE

## 2024-12-02 PROCEDURE — 3079F DIAST BP 80-89 MM HG: CPT | Performed by: FAMILY MEDICINE

## 2024-12-02 NOTE — PATIENT INSTRUCTIONS
BP goal is 110-130 top number.  Continue lisinopril 10 mg - two per day AND Norvasc (amlodipine) one per day.

## 2024-12-02 NOTE — PROGRESS NOTES
Subjective     Patient ID: [id number]     Chuy Salazar is a 69 y.o. male who presents for Follow up BP & leg pain.    HPI  Back and leg pain much improved after 2 epidural injections.  But since cold weather, right leg pain has worsened.      HTN - taking Norvasc and Lisinopril.  States Lisinopril twice daily cause very low BP.  Does not recall home BP readings.  Does not recall names of BP meds but knows their colors.   Started Gabapentin in August but only given 60 day Rx and doesn't have appointment until next week.    Taking 1 at lunch and 2 at bedtime.  Very sedating.  States pain level is 5/10.  Has to go to sleep with an ice pack on low back.      Nephrolithiasis - evaluated by Dr. Brambila.  No problems at this time.        Objective   /85   Pulse 87   Ht 1.829 m (6')   Wt 93 kg (205 lb)   SpO2 97%   BMI 27.80 kg/m²    Vitals:    12/02/24 1428 12/02/24 1432 12/02/24 1508   BP: 149/82 (!) 135/95 131/85   Pulse: 87     SpO2: 97%     Weight: 93 kg (205 lb)     Height: 1.829 m (6')         Physical Exam:   Constant talking.  Anxious.    Ambulation non-antalgic    Assessment/Plan   1. Hypertension, essential (Primary)  Continue Norvasc and Lisinopril as prescribed.  Reiterated goal is <130/80.    2. Nephrolithiasis      3. Chronic right-sided low back pain with right-sided sciatica  Follow up with pain management          Follow up in 3 months for annual physical    I will continue to monitor, evaluate, assess and treat all problems/diagnoses as appropriate and continue to collaborate with specialists.    Contact office or send a [employer] message with any questions or concerns    Encouraged to sign up with [employer]  Patient will only be notified of labs that require medical intervention.    Prescriptions will not be filled unless you are compliant with your follow up appointments or have a follow up appointment scheduled as per instruction of your physician. Refills should be requested at the time  of your visit.    **Charting was completed using voice recognition technology and may include unintended errors**    Kris Schroeder DO, FACOFP  Senior Attending Physician  [facility]  [id number]  [place] [address] [phone]        Kris Schroeder DO

## 2024-12-12 ENCOUNTER — APPOINTMENT (OUTPATIENT)
Dept: PAIN MEDICINE | Facility: CLINIC | Age: 69
End: 2024-12-12
Payer: MEDICARE

## 2024-12-16 ENCOUNTER — APPOINTMENT (OUTPATIENT)
Dept: PRIMARY CARE | Facility: CLINIC | Age: 69
End: 2024-12-16
Payer: MEDICARE

## 2024-12-16 DIAGNOSIS — D22.71 ATYPICAL NEVUS OF RIGHT THIGH: ICD-10-CM

## 2024-12-16 DIAGNOSIS — M48.061 NEUROFORAMINAL STENOSIS OF LUMBAR SPINE: Primary | ICD-10-CM

## 2024-12-16 DIAGNOSIS — D22.4 ATYPICAL NEVUS OF NECK: ICD-10-CM

## 2024-12-16 DIAGNOSIS — M54.17 LUMBOSACRAL RADICULOPATHY AT L5: ICD-10-CM

## 2024-12-16 PROCEDURE — 17003 DESTRUCT PREMALG LES 2-14: CPT | Performed by: FAMILY MEDICINE

## 2024-12-16 PROCEDURE — 17000 DESTRUCT PREMALG LESION: CPT | Performed by: FAMILY MEDICINE

## 2024-12-16 PROCEDURE — 99213 OFFICE O/P EST LOW 20 MIN: CPT | Performed by: FAMILY MEDICINE

## 2024-12-16 NOTE — PROGRESS NOTES
Subjective     Patient ID: [id number]     Chuy Salazar is a 69 y.o. male who presents for Cryo.    HPI  Patient is to skin growths that are irritated by clothing.  First is on the right anterior neck.  Second is in the right proximal thigh, medial aspect.    Patient also complaining of significant lower back pain with radiation into the right buttocks and right leg.  He is working with a pain management physician and has had 2 epidural injections with a third scheduled in early January 2025.  Pain is not improving.  He is having difficulty sleeping.  - Reviewed MRI lumbar spine with patient.  Significant degenerative joint and degenerative disc disease with lumbar spinal canal stenosis.      Objective   There were no vitals taken for this visit.   Physical Exam:   Benign-appearing skin lesions and locations described above.  Neck lesion is dark gray, elevated, polyp like in appearance  Right proximal thigh lesion is flesh-colored, narrow with projection above surface of skin.    Destruction of lesion    Date/Time: 12/16/2024 2:30 PM    Performed by: Kris Schroeder DO  Authorized by: Kris Schroeder DO    Number of Lesions: 2  Lesion 1:     Body area: head/neck    Head/neck location: neck    Malignancy: benign lesion      Destruction method: cryotherapy    Lesion 2:     Body area: lower extremity    Lower extremity location: R upper leg    Malignancy: benign lesion      Destruction method: cryotherapy      Comments:      Lesions were frozen with liquid nitrogen.  2 freeze-thaw cycles.    Anticipate lesions falling off within 2 weeks.  There is no special treatment required.  Patient was instructed to call office if they become red or painful.  If lesions return, will biopsy.        Assessment/Plan   Problem List Items Addressed This Visit       Neuroforaminal stenosis of lumbar spine - Primary    Relevant Orders    Referral to Spine Surgery    Lumbosacral radiculopathy at L5    Relevant Orders    Referral to Spine  Surgery     Other Visit Diagnoses       Atypical nevus of neck        Relevant Orders    Cryotherapy, skin lesion    Destruction of lesion (Completed)    Atypical nevus of right thigh        Relevant Orders    Cryotherapy, skin lesion    Destruction of lesion (Completed)          Follow up in office as scheduled for medical management    I will continue to monitor, evaluate, assess and treat all problems/diagnoses as appropriate and continue to collaborate with specialists.    Contact office or send a  MY Chart message with any questions or concerns    Encouraged to sign up with my  My Chart  Patient will only be notified of labs that require medical intervention.    Prescriptions will not be filled unless you are compliant with your follow up appointments or have a follow up appointment scheduled as per instruction of your physician. Refills should be requested at the time of your visit.    **Charting was completed using voice recognition technology and may include unintended errors**    Kris Schroeder DO, FACOFP  [id number]  [place] [address] [phone]  Kris Schroeder DO

## 2024-12-17 ENCOUNTER — TELEPHONE (OUTPATIENT)
Dept: PAIN MEDICINE | Facility: CLINIC | Age: 69
End: 2024-12-17

## 2024-12-17 DIAGNOSIS — M54.17 LUMBOSACRAL RADICULOPATHY AT L5: ICD-10-CM

## 2024-12-17 RX ORDER — GABAPENTIN 300 MG/1
300 CAPSULE ORAL 3 TIMES DAILY
Qty: 90 CAPSULE | Refills: 2 | Status: SHIPPED | OUTPATIENT
Start: 2024-12-17 | End: 2025-01-16

## 2025-01-09 ENCOUNTER — OFFICE VISIT (OUTPATIENT)
Dept: PAIN MEDICINE | Facility: CLINIC | Age: 70
End: 2025-01-09
Payer: MEDICARE

## 2025-01-09 VITALS
SYSTOLIC BLOOD PRESSURE: 144 MMHG | DIASTOLIC BLOOD PRESSURE: 88 MMHG | RESPIRATION RATE: 18 BRPM | WEIGHT: 205 LBS | TEMPERATURE: 97.9 F | HEIGHT: 72 IN | OXYGEN SATURATION: 96 % | BODY MASS INDEX: 27.77 KG/M2 | HEART RATE: 73 BPM

## 2025-01-09 DIAGNOSIS — M54.17 LUMBOSACRAL RADICULOPATHY AT L5: ICD-10-CM

## 2025-01-09 DIAGNOSIS — S32.030D COMPRESSION FRACTURE OF L3 VERTEBRA WITH ROUTINE HEALING: Primary | ICD-10-CM

## 2025-01-09 DIAGNOSIS — M48.061 NEUROFORAMINAL STENOSIS OF LUMBAR SPINE: ICD-10-CM

## 2025-01-09 DIAGNOSIS — M54.31 SCIATICA OF RIGHT SIDE: ICD-10-CM

## 2025-01-09 PROCEDURE — 1159F MED LIST DOCD IN RCRD: CPT | Performed by: ANESTHESIOLOGY

## 2025-01-09 PROCEDURE — 1036F TOBACCO NON-USER: CPT | Performed by: ANESTHESIOLOGY

## 2025-01-09 PROCEDURE — 3079F DIAST BP 80-89 MM HG: CPT | Performed by: ANESTHESIOLOGY

## 2025-01-09 PROCEDURE — 1125F AMNT PAIN NOTED PAIN PRSNT: CPT | Performed by: ANESTHESIOLOGY

## 2025-01-09 PROCEDURE — 3008F BODY MASS INDEX DOCD: CPT | Performed by: ANESTHESIOLOGY

## 2025-01-09 PROCEDURE — 99214 OFFICE O/P EST MOD 30 MIN: CPT | Performed by: ANESTHESIOLOGY

## 2025-01-09 PROCEDURE — 3077F SYST BP >= 140 MM HG: CPT | Performed by: ANESTHESIOLOGY

## 2025-01-09 PROCEDURE — 1160F RVW MEDS BY RX/DR IN RCRD: CPT | Performed by: ANESTHESIOLOGY

## 2025-01-09 RX ORDER — GABAPENTIN 300 MG/1
300 CAPSULE ORAL 3 TIMES DAILY
Qty: 90 CAPSULE | Refills: 2 | Status: SHIPPED | OUTPATIENT
Start: 2025-01-09 | End: 2025-02-08

## 2025-01-09 ASSESSMENT — PAIN SCALES - GENERAL: PAINLEVEL_OUTOF10: 5

## 2025-01-09 ASSESSMENT — ENCOUNTER SYMPTOMS
LOSS OF SENSATION IN FEET: 0
DEPRESSION: 0
OCCASIONAL FEELINGS OF UNSTEADINESS: 0

## 2025-01-09 ASSESSMENT — COLUMBIA-SUICIDE SEVERITY RATING SCALE - C-SSRS
1. IN THE PAST MONTH, HAVE YOU WISHED YOU WERE DEAD OR WISHED YOU COULD GO TO SLEEP AND NOT WAKE UP?: NO
6. HAVE YOU EVER DONE ANYTHING, STARTED TO DO ANYTHING, OR PREPARED TO DO ANYTHING TO END YOUR LIFE?: NO
2. HAVE YOU ACTUALLY HAD ANY THOUGHTS OF KILLING YOURSELF?: NO

## 2025-01-09 NOTE — H&P (VIEW-ONLY)
Subjective   Patient ID: Chuy Salazar is a 69 y.o. male who presents for lumbosacral radiculopathy.  Patient has severe spinal canal stenosis at L4-5 L5-S1 secondary to lumbar spondylosis and spondylolisthesis.  He has 5 mm now narrowing at the L4-5 level causing radiculopathy.  He states he has 60 to 75% pain relief with the epidural steroid injections and had a transforaminal JEREL done on 9/25/2024 and 11/7/2024.  Patient is scheduled to see orthopedic spine surgeon next week for evaluation possible surgical intervention which I highly recommended.  He denies bladder or bowel dysfunction.  He states the pain affects his activities of daily living.  Risk and benefits to repeat lumbar JEREL was discussed and be scheduled in 2 weeks on January 22, 2020  HPI  Severe spinal canal stenosis L4-5 L5-S1; lumbar spondylosis without myelopathy; lumbosacral radiculopathy secondary to grade 1 spondylolisthesis  Review of Systems  Unremarkable except chief complaint  Objective   Physical Exam  Gen. appearance:  Patient's alert and oriented ×3 ;cooperative mild to moderate distress  Heart: Regular rate and rhythm  Lungs: Clear to auscultation  Abdomen: Soft nontender  Neuro: Cranial nerves II -XII intact; DTR: +2 over 4 biceps triceps brachial radialis patellar and Achilles  Spinal exam: Positive paraspinal tenderness noted bilaterally L4 5 L5-S1 with flexion extension and rotation/no bony abnormalities  Musculoskeletal:  Upper and lower extremity strength was 4/5 bilaterally/positive straight leg raising on the right at 30 degrees  :  deferred  Skin: Warm and dry      Assessment/Plan   Diagnoses and all orders for this visit:  Compression fracture of L3 vertebra with routine healing  Sciatica of right side  Neuroforaminal stenosis of lumbar spine  Lumbosacral radiculopathy at L5  -     gabapentin (Neurontin) 300 mg capsule; Take 1 capsule (300 mg) by mouth 3 times a day.  Patient was told to be n.p.o. for 6 hours prior to  scheduled procedure in the event he needs IV conscious sedation     Nghia Armas DO 01/09/25 10:01 AM

## 2025-01-09 NOTE — PROGRESS NOTES
Pain #5/10 to his right buttocks down the leg to the ankle.  Pain comes and goes.  Pain increases with activity.  Difficult finding a comfortable position to sleep.  Taking Gabapentin, 1 capsule in the morning and 2 in the evening.  Taking Advil for pain.   CHIEF COMPLAINT/INTERVAL HISTORY:    Patient is a 79y old  Male who presents with a chief complaint of sob (26 Apr 2019 12:18)      HPI:  78 yo M hx of chronic diastolic CHF, lung CA, pulmonary fibrosis CAD s/p CABG, ESRD on dialysis (TTS), on home 2L O2, p/w sob and orthopnea x 3 days. Has been wearing 4 L nc o2 all the time. denies lower ext swelling. He also report intermittent left sided chest pain, intermittently radiating to left upper chest but now improved at tiem of hx , c/o productive cough with mild blood tinged yellowish brown phlegm, also had episode of chills and vomiting with nausea yesterday. denies any blood in vomitus ,  no abdominal pain/ vomiting  has been chronic 2-3x/day most days , has low appetite .  Patient last dialyzed yesterday. Denies any sick contacts   Family two daughters  at bedside, patient a&ox3, states that he has not been able to ambulate much at home, has been requiring 2-3 people assist for transfers.  patient. has.    Denies palpitations, irregular and/or rapid heart beat, syncope/near syncope, dizziness, edema, n/v/d, hematuria, or hematochezia.    Patient was DNR/DNI at last admission , today he wishes to be full code.     seen by cardio and nephro in er. plan for urgent HD (19 Apr 2019 16:18)      SUBJECTIVE & OBJECTIVE/ ROS: Pt seen and examined at bedside. No further NSVT or sinus diego on tele. No acute events. No chest pain, palpitations, light headedness/dizziness, fevers/chills, abdominal pain, n/v, diarrhea/constipation, dysuria or increased urinary frequency.         ICU Vital Signs Last 24 Hrs  T(C): 36.6 (26 Apr 2019 12:50), Max: 37 (25 Apr 2019 22:45)  T(F): 97.8 (26 Apr 2019 12:50), Max: 98.6 (25 Apr 2019 22:45)  HR: 56 (26 Apr 2019 14:37) (46 - 71)  BP: 113/51 (26 Apr 2019 12:50) (98/52 - 134/48)  BP(mean): --  ABP: --  ABP(mean): --  RR: 18 (26 Apr 2019 12:50) (16 - 18)  SpO2: 98% (26 Apr 2019 14:37) (96% - 98%)        MEDICATIONS  (STANDING):  ALBUTerol    90 MICROgram(s) HFA Inhaler 1 Puff(s) Inhalation every 4 hours  ALBUTerol/ipratropium for Nebulization 3 milliLiter(s) Nebulizer every 6 hours  aspirin  chewable 324 milliGRAM(s) Oral daily  atorvastatin 80 milliGRAM(s) Oral at bedtime  buMETAnide 2 milliGRAM(s) Oral daily  chlorhexidine 2% Cloths 1 Application(s) Topical <User Schedule>  clopidogrel Tablet 75 milliGRAM(s) Oral daily  dextrose 5%. 1000 milliLiter(s) (50 mL/Hr) IV Continuous <Continuous>  dextrose 50% Injectable 12.5 Gram(s) IV Push once  dextrose 50% Injectable 25 Gram(s) IV Push once  dextrose 50% Injectable 25 Gram(s) IV Push once  docusate sodium 100 milliGRAM(s) Oral three times a day  DULoxetine 20 milliGRAM(s) Oral daily  gabapentin 300 milliGRAM(s) Oral two times a day  heparin  Injectable 5000 Unit(s) SubCutaneous every 12 hours  isosorbide   mononitrate ER Tablet (IMDUR) 120 milliGRAM(s) Oral daily  levothyroxine 100 MICROGram(s) Oral daily  losartan 100 milliGRAM(s) Oral daily  melatonin 3 milliGRAM(s) Oral at bedtime  metoprolol tartrate 50 milliGRAM(s) Oral every 8 hours  mupirocin 2% Nasal 1 Application(s) Nasal two times a day  Nephro-jeana 1 Tablet(s) Oral daily  pantoprazole    Tablet 40 milliGRAM(s) Oral before breakfast  senna 2 Tablet(s) Oral at bedtime  tiotropium 18 MICROgram(s) Capsule 1 Capsule(s) Inhalation daily    MEDICATIONS  (PRN):  dextrose 40% Gel 15 Gram(s) Oral once PRN Blood Glucose LESS THAN 70 milliGRAM(s)/deciLiter  glucagon  Injectable 1 milliGRAM(s) IntraMuscular once PRN Glucose <70 milliGRAM(s)/deciLiter  hydrALAZINE Injectable 10 milliGRAM(s) IV Push every 6 hours PRN for SBP > 150      LABS:                        11.0   9.8   )-----------( 210      ( 26 Apr 2019 06:17 )             35.9     04-26    138  |  97<L>  |  25.0<H>  ----------------------------<  94  4.4   |  28.0  |  5.13<H>    Ca    8.7      26 Apr 2019 06:17  Phos  4.1     04-26  Mg     2.2     04-26            CAPILLARY BLOOD GLUCOSE          RECENT CULTURES:      RADIOLOGY & ADDITIONAL TESTS:      PHYSICAL EXAM:    GENERAL: supine, lying flat, elderly male, very thin, NAD  HEENT: NC/AT  NECK: Supple, No JVD   CHEST/LUNG: diminished at bases ; Normal effort  HEART: S1S2 Normal intensity, no murmurs, gallops or rubs noted  ABDOMEN: Soft, BS Normoactive, NT, ND, no HSM noted  EXTREMITIES: no sig edema, +AVF with thrill  SKIN: No rashes or lesions noted  NEURO: grossly intact  PSYCH: normal mood and affect; insight/judgement appropriate

## 2025-01-15 ENCOUNTER — TELEPHONE (OUTPATIENT)
Dept: PRIMARY CARE | Facility: CLINIC | Age: 70
End: 2025-01-15
Payer: MEDICARE

## 2025-01-15 NOTE — TELEPHONE ENCOUNTER
Patient called and said his pain management Dr. Ilay Armas stated he should see a Neuro surgeon for his back(not pain management). Patient is wondering if you could put in a referral(he feels he is wasting his time and money). Any questions patient said it was okay to call him(no my chart/no computer)    Thanks

## 2025-01-16 ENCOUNTER — APPOINTMENT (OUTPATIENT)
Dept: ORTHOPEDIC SURGERY | Facility: CLINIC | Age: 70
End: 2025-01-16
Payer: MEDICARE

## 2025-01-16 DIAGNOSIS — M54.41 CHRONIC RIGHT-SIDED LOW BACK PAIN WITH RIGHT-SIDED SCIATICA: ICD-10-CM

## 2025-01-16 DIAGNOSIS — G89.29 CHRONIC RIGHT-SIDED LOW BACK PAIN WITH RIGHT-SIDED SCIATICA: ICD-10-CM

## 2025-01-16 DIAGNOSIS — S32.030D COMPRESSION FRACTURE OF L3 VERTEBRA WITH ROUTINE HEALING: Primary | ICD-10-CM

## 2025-01-22 ENCOUNTER — HOSPITAL ENCOUNTER (OUTPATIENT)
Dept: PAIN MEDICINE | Facility: CLINIC | Age: 70
Discharge: HOME | End: 2025-01-22
Payer: MEDICARE

## 2025-01-22 VITALS
DIASTOLIC BLOOD PRESSURE: 62 MMHG | TEMPERATURE: 97.5 F | BODY MASS INDEX: 27.77 KG/M2 | SYSTOLIC BLOOD PRESSURE: 109 MMHG | WEIGHT: 205 LBS | OXYGEN SATURATION: 94 % | HEIGHT: 72 IN | HEART RATE: 65 BPM | RESPIRATION RATE: 16 BRPM

## 2025-01-22 DIAGNOSIS — M54.17 LUMBOSACRAL RADICULOPATHY AT L5: ICD-10-CM

## 2025-01-22 PROCEDURE — 99152 MOD SED SAME PHYS/QHP 5/>YRS: CPT | Performed by: ANESTHESIOLOGY

## 2025-01-22 PROCEDURE — 62323 NJX INTERLAMINAR LMBR/SAC: CPT | Performed by: ANESTHESIOLOGY

## 2025-01-22 PROCEDURE — 2550000001 HC RX 255 CONTRASTS: Performed by: ANESTHESIOLOGY

## 2025-01-22 PROCEDURE — 2500000005 HC RX 250 GENERAL PHARMACY W/O HCPCS: Performed by: ANESTHESIOLOGY

## 2025-01-22 PROCEDURE — 2500000004 HC RX 250 GENERAL PHARMACY W/ HCPCS (ALT 636 FOR OP/ED): Performed by: ANESTHESIOLOGY

## 2025-01-22 RX ORDER — METHYLPREDNISOLONE ACETATE 80 MG/ML
INJECTION, SUSPENSION INTRA-ARTICULAR; INTRALESIONAL; INTRAMUSCULAR; SOFT TISSUE AS NEEDED
Status: COMPLETED | OUTPATIENT
Start: 2025-01-22 | End: 2025-01-22

## 2025-01-22 RX ORDER — MIDAZOLAM HYDROCHLORIDE 1 MG/ML
1 INJECTION, SOLUTION INTRAMUSCULAR; INTRAVENOUS
Status: SHIPPED | OUTPATIENT
Start: 2025-01-22 | End: 2025-01-22

## 2025-01-22 RX ORDER — FENTANYL CITRATE 50 UG/ML
50 INJECTION, SOLUTION INTRAMUSCULAR; INTRAVENOUS AS NEEDED
Status: SHIPPED | OUTPATIENT
Start: 2025-01-22 | End: 2025-01-22

## 2025-01-22 RX ADMIN — METHYLPREDNISOLONE ACETATE 80 MG: 80 INJECTION, SUSPENSION INTRA-ARTICULAR; INTRALESIONAL; INTRAMUSCULAR; SOFT TISSUE at 10:10

## 2025-01-22 RX ADMIN — Medication 2 L/MIN: at 10:09

## 2025-01-22 RX ADMIN — IOHEXOL 3 ML: 240 INJECTION, SOLUTION INTRATHECAL; INTRAVASCULAR; INTRAVENOUS; ORAL at 10:11

## 2025-01-22 ASSESSMENT — ENCOUNTER SYMPTOMS
DEPRESSION: 0
LOSS OF SENSATION IN FEET: 0
OCCASIONAL FEELINGS OF UNSTEADINESS: 0

## 2025-01-22 ASSESSMENT — PAIN - FUNCTIONAL ASSESSMENT
PAIN_FUNCTIONAL_ASSESSMENT: 0-10

## 2025-01-22 ASSESSMENT — PAIN SCALES - GENERAL
PAINLEVEL_OUTOF10: 0 - NO PAIN
PAINLEVEL_OUTOF10: 1
PAINLEVEL_OUTOF10: 0 - NO PAIN
PAINLEVEL_OUTOF10: 6

## 2025-01-22 NOTE — DISCHARGE INSTRUCTIONS
Discharge instructions reviewed verbally and printed instructions given.  Patient verbalize understanding.    Moderate sedation instructions verbally reviewed with patient

## 2025-02-18 ENCOUNTER — APPOINTMENT (OUTPATIENT)
Dept: UROLOGY | Facility: CLINIC | Age: 70
End: 2025-02-18
Payer: MEDICARE

## 2025-03-03 ENCOUNTER — APPOINTMENT (OUTPATIENT)
Dept: PRIMARY CARE | Facility: CLINIC | Age: 70
End: 2025-03-03
Payer: MEDICARE

## 2025-03-03 VITALS
DIASTOLIC BLOOD PRESSURE: 89 MMHG | OXYGEN SATURATION: 97 % | HEIGHT: 72 IN | HEART RATE: 69 BPM | WEIGHT: 202 LBS | SYSTOLIC BLOOD PRESSURE: 153 MMHG | BODY MASS INDEX: 27.36 KG/M2

## 2025-03-03 DIAGNOSIS — R39.198 SLOWING OF URINARY STREAM: ICD-10-CM

## 2025-03-03 DIAGNOSIS — Z00.00 HEALTHCARE MAINTENANCE: Primary | ICD-10-CM

## 2025-03-03 DIAGNOSIS — Z12.11 COLON CANCER SCREENING: ICD-10-CM

## 2025-03-03 DIAGNOSIS — N40.0 BENIGN PROSTATIC HYPERPLASIA, UNSPECIFIED WHETHER LOWER URINARY TRACT SYMPTOMS PRESENT: ICD-10-CM

## 2025-03-03 DIAGNOSIS — I10 HYPERTENSION, ESSENTIAL: ICD-10-CM

## 2025-03-03 DIAGNOSIS — E78.5 HYPERLIPIDEMIA, UNSPECIFIED HYPERLIPIDEMIA TYPE: ICD-10-CM

## 2025-03-03 DIAGNOSIS — E55.9 VITAMIN D DEFICIENCY: ICD-10-CM

## 2025-03-03 DIAGNOSIS — E29.1 HYPOGONADISM MALE: ICD-10-CM

## 2025-03-03 PROCEDURE — 3008F BODY MASS INDEX DOCD: CPT | Performed by: FAMILY MEDICINE

## 2025-03-03 PROCEDURE — 3077F SYST BP >= 140 MM HG: CPT | Performed by: FAMILY MEDICINE

## 2025-03-03 PROCEDURE — 1159F MED LIST DOCD IN RCRD: CPT | Performed by: FAMILY MEDICINE

## 2025-03-03 PROCEDURE — 3079F DIAST BP 80-89 MM HG: CPT | Performed by: FAMILY MEDICINE

## 2025-03-03 PROCEDURE — 1160F RVW MEDS BY RX/DR IN RCRD: CPT | Performed by: FAMILY MEDICINE

## 2025-03-03 PROCEDURE — G2211 COMPLEX E/M VISIT ADD ON: HCPCS | Performed by: FAMILY MEDICINE

## 2025-03-03 PROCEDURE — 1036F TOBACCO NON-USER: CPT | Performed by: FAMILY MEDICINE

## 2025-03-03 PROCEDURE — 99214 OFFICE O/P EST MOD 30 MIN: CPT | Performed by: FAMILY MEDICINE

## 2025-03-03 RX ORDER — AMLODIPINE BESYLATE 5 MG/1
5 TABLET ORAL DAILY
Qty: 90 TABLET | Refills: 2 | Status: SHIPPED | OUTPATIENT
Start: 2025-03-03 | End: 2025-11-28

## 2025-03-03 RX ORDER — LISINOPRIL 20 MG/1
20 TABLET ORAL DAILY
Qty: 90 TABLET | Refills: 2 | Status: SHIPPED | OUTPATIENT
Start: 2025-03-03 | End: 2025-11-28

## 2025-03-03 NOTE — PROGRESS NOTES
Annual Comprehensive Medical Exam    69 y.o. male presents for annual comprehensive medical evaluation and preventive services screening.  No recent hospitalizations, surgeries or significant injuries.    HPI  Last colonoscopy in 2016.  Was told to repeat in 3 years.  No evidence this was completed.    Chronic pain past few months.  Pain has Caused BP to be elevated.  Did not take BP Rx because ran out.  Not taking BP at home.  Gabapentin has been very effective for pain management.  Rx by Pain Management physician.      Past Medical History:   Diagnosis Date    Abdominal injury 2014    Concussion 2014    TBI (traumatic brain injury) (Multi)     multiple during youth boxing      Past Surgical History:   Procedure Laterality Date    OTHER SURGICAL HISTORY  01/20/2016    Laparoscopy Repair Of Initial Inguinal Hernia    SHOULDER SURGERY  01/19/2015    Shoulder Surgery    VASECTOMY  01/04/2016    Surgery Vas Deferens Vasectomy     Family History   Problem Relation Name Age of Onset    Colon cancer Father      Other (cardiac disorder) Mother        Social History     Socioeconomic History    Marital status: Single     Spouse name: Not on file    Number of children: Not on file    Years of education: Not on file    Highest education level: Not on file   Occupational History    Not on file   Tobacco Use    Smoking status: Never    Smokeless tobacco: Never   Substance and Sexual Activity    Alcohol use: Not Currently    Drug use: Not Currently    Sexual activity: Not on file   Other Topics Concern    Not on file   Social History Narrative    Not on file     Social Drivers of Health     Financial Resource Strain: Not on file   Food Insecurity: Not on file   Transportation Needs: Not on file   Physical Activity: Not on file   Stress: Not on file   Social Connections: Not on file   Intimate Partner Violence: Not on file   Housing Stability: Not on file       Current Outpatient Medications on File Prior to Visit   Medication  Sig Dispense Refill    gabapentin (Neurontin) 300 mg capsule Take 1 capsule (300 mg) by mouth 3 times a day. 90 capsule 2    [DISCONTINUED] amLODIPine (Norvasc) 5 mg tablet Take 1 tablet (5 mg) by mouth once daily. 90 tablet 2    [DISCONTINUED] lisinopril 10 mg tablet Take 2 tablets (20 mg) by mouth once daily. 90 tablet 2    clobetasol (Temovate) 0.05 % cream APPLY TO BOTH HANDS AND ARMS TWICE DAILY FOR 14 DAYS. (Patient not taking: Reported on 3/3/2025)      [DISCONTINUED] methocarbamol (Robaxin) 500 mg tablet Take 1 tablet (500 mg) by mouth 3 times a day as needed for muscle spasms. (Patient not taking: Reported on 3/3/2025) 40 tablet 3    [DISCONTINUED] sildenafil (Viagra) 100 mg tablet Take 1 tablet (100 mg) by mouth once daily as needed for erectile dysfunction. (Patient not taking: Reported on 3/3/2025) 30 tablet 2     No current facility-administered medications on file prior to visit.       No Known Allergies      Review of Systems:  Complete review of systems is negative today except for that mentioned in the history of present illness.  In particular patient denies chest pain, shortness of breath, headaches and GI disturbances.      Visit Vitals  /89   Pulse 69   Ht 1.829 m (6')   Wt 91.6 kg (202 lb)   SpO2 97%   BMI 27.40 kg/m²   Smoking Status Never   BSA 2.16 m²      Vitals:    03/03/25 1517 03/03/25 1522   BP: (!) 160/91 153/89   Pulse: 69    SpO2: 97%    Weight: 91.6 kg (202 lb)    Height: 1.829 m (6')      Physical Exam  Gen: Alert and oriented ×3 male in no acute distress.  HEENT: Head is normocephalic.  Extraocular muscles are intact.  Tympanic membranes are clear.  Pharynx is clear.  Neck is supple without adenopathy or carotid bruits.  No masses or thyromegaly  Heart: Regular rate and rhythm without murmurs.  Lungs: Clear to auscultation bilaterally.  Abdomen: Soft with normal bowel sounds.  No masses or pain to palpation.  No bruits auscultated.  Extremities: Good range of motion of all  joints.  No significant edema. Pedal pulses +1-2/4  Neuro: No signs of focal neurologic deficit.  No tremor.  Speech and hearing are normal.  DTRs +3/4;  Muscle Strength +5/5.  Musculoskeletal: Spine with good ROM.  No scoliosis.  Leg lengths are equal.  Skin: No significant or irregular nevi visualized.  Psych: normal affect.  No suicidal ideation.  Good judgement and insight.       DIAGNOSIS/PLAN    1. Healthcare maintenance (Primary)    2. Hypertension, essential  -Hypertension: Discussed importance of good blood pressure control to avoid long-term complications such as heart attack and stroke.  Patient is aware that blood pressure goal is less than 130/80.  Maintaining a regular exercise program and body mass index (BMI) less than 25 as well as a diet lower in carbohydrates will help reach these goals.  - Comprehensive Metabolic Panel; Future  - amLODIPine (Norvasc) 5 mg tablet; Take 1 tablet (5 mg) by mouth once daily.  Dispense: 90 tablet; Refill: 2  - lisinopril 20 mg tablet; Take 1 tablet (20 mg) by mouth once daily.  Dispense: 90 tablet; Refill: 2  - Comprehensive Metabolic Panel    3. Hyperlipidemia, unspecified hyperlipidemia type  - Lipid Panel; Future  - Lipid Panel    4. Vitamin D deficiency  - CBC; Future  - Vitamin D 25-Hydroxy,Total (for eval of Vitamin D levels); Future  - CBC  - Vitamin D 25-Hydroxy,Total (for eval of Vitamin D levels)    5. Hypogonadism male  - TSH with reflex to Free T4 if abnormal; Future  - Testosterone; Future  - TSH with reflex to Free T4 if abnormal  - Testosterone    6. Slowing of urinary stream  - Prostate Specific Antigen; Future  - Urinalysis with Reflex Microscopic; Future  - Prostate Specific Antigen  - Urinalysis with Reflex Microscopic    7. Benign prostatic hyperplasia, unspecified whether lower urinary tract symptoms present  - Prostate Specific Antigen; Future  - Urinalysis with Reflex Microscopic; Future  - Prostate Specific Antigen  - Urinalysis with Reflex  Microscopic    8. Colon cancer screening  - Referral to Gastroenterology; Future        Follow up in 6 months for medical management    I will continue to monitor, evaluate, assess and treat all problems/diagnoses as appropriate and continue to collaborate with specialists.    Contact office or send a  MY Chart message with any questions or concerns    Encouraged to sign up with my  My Chart  Patient will only be notified of labs that require medical intervention.    Prescriptions will not be filled unless you are compliant with your follow up appointments or have a follow up appointment scheduled as per instruction of your physician. Refills should be requested at the time of your visit.    **Charting was completed using voice recognition technology and may include unintended errors**    Kris Schroeder DO, JACOB  [id number] [facility], [id number]  [place] [id number] [phone]  Kris Schroeder DO, YURIP

## 2025-03-04 DIAGNOSIS — E78.5 HYPERLIPIDEMIA, UNSPECIFIED HYPERLIPIDEMIA TYPE: Primary | ICD-10-CM

## 2025-03-04 LAB
25(OH)D3+25(OH)D2 SERPL-MCNC: 10 NG/ML (ref 30–100)
ALBUMIN SERPL-MCNC: 4.8 G/DL (ref 3.6–5.1)
ALP SERPL-CCNC: 63 U/L (ref 35–144)
ALT SERPL-CCNC: 30 U/L (ref 9–46)
ANION GAP SERPL CALCULATED.4IONS-SCNC: 9 MMOL/L (CALC) (ref 7–17)
APPEARANCE UR: CLEAR
AST SERPL-CCNC: 24 U/L (ref 10–35)
BILIRUB SERPL-MCNC: 0.7 MG/DL (ref 0.2–1.2)
BILIRUB UR QL STRIP: NEGATIVE
BUN SERPL-MCNC: 22 MG/DL (ref 7–25)
CALCIUM SERPL-MCNC: 9.8 MG/DL (ref 8.6–10.3)
CHLORIDE SERPL-SCNC: 102 MMOL/L (ref 98–110)
CHOLEST SERPL-MCNC: 163 MG/DL
CHOLEST/HDLC SERPL: 3.9 (CALC)
CO2 SERPL-SCNC: 27 MMOL/L (ref 20–32)
COLOR UR: YELLOW
CREAT SERPL-MCNC: 0.62 MG/DL (ref 0.7–1.35)
EGFRCR SERPLBLD CKD-EPI 2021: 105 ML/MIN/1.73M2
ERYTHROCYTE [DISTWIDTH] IN BLOOD BY AUTOMATED COUNT: 12.5 % (ref 11–15)
GLUCOSE SERPL-MCNC: 98 MG/DL (ref 65–99)
GLUCOSE UR QL STRIP: NEGATIVE
HCT VFR BLD AUTO: 48.7 % (ref 38.5–50)
HDLC SERPL-MCNC: 42 MG/DL
HGB BLD-MCNC: 16.3 G/DL (ref 13.2–17.1)
HGB UR QL STRIP: NEGATIVE
KETONES UR QL STRIP: NEGATIVE
LDLC SERPL CALC-MCNC: ABNORMAL MG/DL
LEUKOCYTE ESTERASE UR QL STRIP: NEGATIVE
MCH RBC QN AUTO: 28.5 PG (ref 27–33)
MCHC RBC AUTO-ENTMCNC: 33.5 G/DL (ref 32–36)
MCV RBC AUTO: 85.3 FL (ref 80–100)
NITRITE UR QL STRIP: NEGATIVE
NONHDLC SERPL-MCNC: 121 MG/DL (CALC)
PH UR STRIP: 5.5 [PH] (ref 5–8)
PLATELET # BLD AUTO: 282 THOUSAND/UL (ref 140–400)
PMV BLD REES-ECKER: 9.8 FL (ref 7.5–12.5)
POTASSIUM SERPL-SCNC: 4.8 MMOL/L (ref 3.5–5.3)
PROT SERPL-MCNC: 7.8 G/DL (ref 6.1–8.1)
PROT UR QL STRIP: NEGATIVE
PSA SERPL-MCNC: 1.78 NG/ML
RBC # BLD AUTO: 5.71 MILLION/UL (ref 4.2–5.8)
SODIUM SERPL-SCNC: 138 MMOL/L (ref 135–146)
SP GR UR STRIP: 1.03 (ref 1–1.03)
TESTOST SERPL-MCNC: 821 NG/DL (ref 250–827)
TRIGL SERPL-MCNC: 438 MG/DL
TSH SERPL-ACNC: 1.88 MIU/L (ref 0.4–4.5)
WBC # BLD AUTO: 8.9 THOUSAND/UL (ref 3.8–10.8)

## 2025-03-13 ENCOUNTER — OFFICE VISIT (OUTPATIENT)
Dept: PAIN MEDICINE | Facility: CLINIC | Age: 70
End: 2025-03-13
Payer: MEDICARE

## 2025-03-13 VITALS
SYSTOLIC BLOOD PRESSURE: 149 MMHG | RESPIRATION RATE: 18 BRPM | WEIGHT: 202 LBS | HEIGHT: 72 IN | HEART RATE: 67 BPM | DIASTOLIC BLOOD PRESSURE: 80 MMHG | BODY MASS INDEX: 27.36 KG/M2 | OXYGEN SATURATION: 95 % | TEMPERATURE: 97.9 F

## 2025-03-13 DIAGNOSIS — M54.17 LUMBOSACRAL RADICULOPATHY AT L5: ICD-10-CM

## 2025-03-13 DIAGNOSIS — M48.061 NEUROFORAMINAL STENOSIS OF LUMBAR SPINE: ICD-10-CM

## 2025-03-13 DIAGNOSIS — S32.030D COMPRESSION FRACTURE OF L3 VERTEBRA WITH ROUTINE HEALING: Primary | ICD-10-CM

## 2025-03-13 PROCEDURE — 1036F TOBACCO NON-USER: CPT | Performed by: ANESTHESIOLOGY

## 2025-03-13 PROCEDURE — 3008F BODY MASS INDEX DOCD: CPT | Performed by: ANESTHESIOLOGY

## 2025-03-13 PROCEDURE — 1160F RVW MEDS BY RX/DR IN RCRD: CPT | Performed by: ANESTHESIOLOGY

## 2025-03-13 PROCEDURE — 3077F SYST BP >= 140 MM HG: CPT | Performed by: ANESTHESIOLOGY

## 2025-03-13 PROCEDURE — 3079F DIAST BP 80-89 MM HG: CPT | Performed by: ANESTHESIOLOGY

## 2025-03-13 PROCEDURE — 99213 OFFICE O/P EST LOW 20 MIN: CPT | Performed by: ANESTHESIOLOGY

## 2025-03-13 PROCEDURE — 1125F AMNT PAIN NOTED PAIN PRSNT: CPT | Performed by: ANESTHESIOLOGY

## 2025-03-13 PROCEDURE — 1159F MED LIST DOCD IN RCRD: CPT | Performed by: ANESTHESIOLOGY

## 2025-03-13 ASSESSMENT — ENCOUNTER SYMPTOMS
OCCASIONAL FEELINGS OF UNSTEADINESS: 0
LOSS OF SENSATION IN FEET: 0
DEPRESSION: 0

## 2025-03-13 ASSESSMENT — COLUMBIA-SUICIDE SEVERITY RATING SCALE - C-SSRS
1. IN THE PAST MONTH, HAVE YOU WISHED YOU WERE DEAD OR WISHED YOU COULD GO TO SLEEP AND NOT WAKE UP?: NO
2. HAVE YOU ACTUALLY HAD ANY THOUGHTS OF KILLING YOURSELF?: NO
6. HAVE YOU EVER DONE ANYTHING, STARTED TO DO ANYTHING, OR PREPARED TO DO ANYTHING TO END YOUR LIFE?: NO

## 2025-03-13 ASSESSMENT — PAIN SCALES - GENERAL: PAINLEVEL_OUTOF10: 5

## 2025-03-13 NOTE — PROGRESS NOTES
Pain #5/10 and is not constant since his Lumbar block on 1-22-25.  States 50% better since the injection.  Sleeps better, no steady pain.

## 2025-03-13 NOTE — PROGRESS NOTES
Subjective   Patient ID: Chuy Salazar is a 69 y.o. male is a follow-up after a lumbar epidural steroid injection on January 22, 2025.  He has severe spinal canal stenosis at the L4-5 level along with spondylolisthesis and facet arthrosis.  He had a lumbar JEREL which gave him some 75% relief overall and he is able to function at a high level without taking pain medication outside of gabapentin.  He denies bladder or bowel dysfunction.    HPI  Chronic low back pain secondary to neuroforaminal stenosis and spinal stenosis L4-5 L5-S1; history of vertebral compression fracture L3; lumbosacral radiculopathy  Review of Systems  Unremarkable except chief complaint  Objective   Physical Exam  Gen. appearance:  Patient's alert and oriented ×3 ;cooperative mild to moderate distress  Heart: Regular rate and rhythm  Lungs: Clear to auscultation  Abdomen: Soft nontender  Neuro: Cranial nerves II -XII intact; DTR: +2 over 4 biceps triceps brachial radialis patellar and Achilles  Spinal exam: Positive paraspinal tenderness noted bilaterally L4 5 L5-S1 with flexion extension and rotation/no bony abnormalities  Musculoskeletal:  Upper and lower extremity strength was 4/5 bilaterally  :  deferred  Skin: Warm and dry      Assessment/Plan   Diagnoses and all orders for this visit:  Compression fracture of L3 vertebra with routine healing  Lumbosacral radiculopathy at L5  -     Follow Up In Pain Medicine  -     Follow Up In Pain Medicine; Future  Neuroforaminal stenosis of lumbar spine  Patient was given the option of a repeat lumbar JEREL and he is chosen to take a 3-month sabbatical of procedures.  He feels that he is functioning at a high level by just continue on his gabapentin.  Patient does not need any refills and was told to take extreme Tylenol for pain control for breakthrough pain.  He was also encouraged to call if he had any further problems needing more immediate attention.

## 2025-03-18 DIAGNOSIS — E78.5 HYPERLIPIDEMIA, UNSPECIFIED HYPERLIPIDEMIA TYPE: ICD-10-CM

## 2025-06-12 ENCOUNTER — OFFICE VISIT (OUTPATIENT)
Dept: PAIN MEDICINE | Facility: CLINIC | Age: 70
End: 2025-06-12
Payer: MEDICARE

## 2025-06-12 ENCOUNTER — TELEPHONE (OUTPATIENT)
Dept: PAIN MEDICINE | Facility: CLINIC | Age: 70
End: 2025-06-12

## 2025-06-12 VITALS
RESPIRATION RATE: 18 BRPM | HEART RATE: 64 BPM | BODY MASS INDEX: 27.36 KG/M2 | TEMPERATURE: 97.9 F | SYSTOLIC BLOOD PRESSURE: 141 MMHG | WEIGHT: 202 LBS | HEIGHT: 72 IN | OXYGEN SATURATION: 96 % | DIASTOLIC BLOOD PRESSURE: 89 MMHG

## 2025-06-12 DIAGNOSIS — S32.030D COMPRESSION FRACTURE OF L3 VERTEBRA WITH ROUTINE HEALING: ICD-10-CM

## 2025-06-12 DIAGNOSIS — M48.061 NEUROFORAMINAL STENOSIS OF LUMBAR SPINE: Primary | ICD-10-CM

## 2025-06-12 DIAGNOSIS — M54.42 ACUTE LEFT-SIDED LOW BACK PAIN WITH LEFT-SIDED SCIATICA: ICD-10-CM

## 2025-06-12 DIAGNOSIS — M54.17 LUMBOSACRAL RADICULOPATHY AT L5: ICD-10-CM

## 2025-06-12 PROCEDURE — 99213 OFFICE O/P EST LOW 20 MIN: CPT | Performed by: ANESTHESIOLOGY

## 2025-06-12 PROCEDURE — 3008F BODY MASS INDEX DOCD: CPT | Performed by: ANESTHESIOLOGY

## 2025-06-12 PROCEDURE — 3077F SYST BP >= 140 MM HG: CPT | Performed by: ANESTHESIOLOGY

## 2025-06-12 PROCEDURE — 1159F MED LIST DOCD IN RCRD: CPT | Performed by: ANESTHESIOLOGY

## 2025-06-12 PROCEDURE — 3079F DIAST BP 80-89 MM HG: CPT | Performed by: ANESTHESIOLOGY

## 2025-06-12 PROCEDURE — 1125F AMNT PAIN NOTED PAIN PRSNT: CPT | Performed by: ANESTHESIOLOGY

## 2025-06-12 PROCEDURE — 1036F TOBACCO NON-USER: CPT | Performed by: ANESTHESIOLOGY

## 2025-06-12 PROCEDURE — 1160F RVW MEDS BY RX/DR IN RCRD: CPT | Performed by: ANESTHESIOLOGY

## 2025-06-12 RX ORDER — GABAPENTIN 300 MG/1
300 CAPSULE ORAL 3 TIMES DAILY
Qty: 90 CAPSULE | Refills: 2 | Status: SHIPPED | OUTPATIENT
Start: 2025-06-12 | End: 2025-07-12

## 2025-06-12 ASSESSMENT — PAIN SCALES - GENERAL: PAINLEVEL_OUTOF10: 8

## 2025-06-12 ASSESSMENT — ENCOUNTER SYMPTOMS
OCCASIONAL FEELINGS OF UNSTEADINESS: 0
DEPRESSION: 0
LOSS OF SENSATION IN FEET: 0

## 2025-06-12 NOTE — H&P
History Of Present Illness  Chuy Salazar is a 69 y.o. male presenting with a chief complaint of chronic low back pain secondary to bulging disc at L4-L5 causing nerve root impingement.  He also has grade 1 spondylolisthesis L4 on 5.  He had 60% pain relief after lumbar JEREL was done on 1/22/2025 and is complaining of new onset of pain.     Past Medical History  He has a past medical history of Abdominal injury (2014), Concussion (2014), and TBI (traumatic brain injury) (Multi).    Surgical History  He has a past surgical history that includes Shoulder surgery (01/19/2015); Other surgical history (01/20/2016); and Vasectomy (01/04/2016).     Social History  He reports that he has never smoked. He has never used smokeless tobacco. He reports that he does not currently use alcohol. He reports that he does not currently use drugs.    Family History  Family History[1]     Allergies  Patient has no known allergies.    Review of Systems  Unremarkable except for chief complaint  Physical Exam  Gen. appearance:  Patient's alert and oriented ×3 ;cooperative mild to moderate distress  Heart: Regular rate and rhythm  Lungs: Clear to auscultation  Abdomen: Soft nontender  Neuro: Cranial nerves II -XII intact; DTR: +2 over 4 biceps triceps brachial radialis patellar and Achilles  Spinal exam: Positive paraspinal tenderness noted bilaterally L4 5 L5-S1 with flexion extension and rotation/no bony abnormalities  Musculoskeletal:  Upper and lower extremity strength was 4/5 bilaterally  :  deferred  Skin: Warm and dry    Last Recorded Vitals  /89   Pulse 64   Temp 36.6 °C (97.9 °F)   Resp 18   Wt 91.6 kg (202 lb)   SpO2 96%     ASSESSMENT:  1.  Lumbar disc herniation L4-5 L5-S1 resulting in neuroforaminal stenosis  2.  Lumbar spondylolisthesis L4-5  3.  Chronic low back pain with sciatica  4.  Vertebral compression fracture L3    PLAN:  Risk and benefits of a repeat lumbar JEREL under fluoroscopy was discussed with the  patient the scheduled for 6/18/2025 risk and benefits of the procedure was discussed with him at length and he was told to be n.p.o. for 6 hours prior to scheduled procedure since she is requested IV conscious sedation.      Nghia Armas DO         [1]   Family History  Problem Relation Name Age of Onset    Colon cancer Father      Other (cardiac disorder) Mother

## 2025-06-12 NOTE — PROGRESS NOTES
Subjective   Patient ID: Chuy Salazar is a 69 y.o. male who presents for Pain.  HPI    Review of Systems    Objective   Physical Exam    Assessment/Plan            Nghia Armas DO 06/12/25 8:58 AM

## 2025-06-12 NOTE — PROGRESS NOTES
Pain #8/10 right buttocks, lower leg, lower back.  Pain increases with walking and standing.  Takes Gabapentin BID. Needs refill.

## 2025-06-18 ENCOUNTER — HOSPITAL ENCOUNTER (OUTPATIENT)
Dept: PAIN MEDICINE | Facility: CLINIC | Age: 70
Discharge: HOME | End: 2025-06-18
Payer: MEDICARE

## 2025-06-18 VITALS
DIASTOLIC BLOOD PRESSURE: 88 MMHG | SYSTOLIC BLOOD PRESSURE: 140 MMHG | HEIGHT: 72 IN | WEIGHT: 200 LBS | TEMPERATURE: 98.1 F | BODY MASS INDEX: 27.09 KG/M2 | OXYGEN SATURATION: 94 % | HEART RATE: 60 BPM | RESPIRATION RATE: 16 BRPM

## 2025-06-18 DIAGNOSIS — M54.17 LUMBOSACRAL RADICULOPATHY AT L5: Primary | ICD-10-CM

## 2025-06-18 PROCEDURE — 62323 NJX INTERLAMINAR LMBR/SAC: CPT | Performed by: ANESTHESIOLOGY

## 2025-06-18 PROCEDURE — 99152 MOD SED SAME PHYS/QHP 5/>YRS: CPT | Performed by: ANESTHESIOLOGY

## 2025-06-18 PROCEDURE — 2500000005 HC RX 250 GENERAL PHARMACY W/O HCPCS: Performed by: ANESTHESIOLOGY

## 2025-06-18 PROCEDURE — 99153 MOD SED SAME PHYS/QHP EA: CPT | Performed by: ANESTHESIOLOGY

## 2025-06-18 PROCEDURE — 2500000004 HC RX 250 GENERAL PHARMACY W/ HCPCS (ALT 636 FOR OP/ED): Performed by: ANESTHESIOLOGY

## 2025-06-18 PROCEDURE — 2550000001 HC RX 255 CONTRASTS: Performed by: ANESTHESIOLOGY

## 2025-06-18 RX ORDER — MIDAZOLAM HYDROCHLORIDE 1 MG/ML
INJECTION, SOLUTION INTRAMUSCULAR; INTRAVENOUS AS NEEDED
Status: COMPLETED | OUTPATIENT
Start: 2025-06-18 | End: 2025-06-18

## 2025-06-18 RX ORDER — FENTANYL CITRATE 50 UG/ML
50 INJECTION, SOLUTION INTRAMUSCULAR; INTRAVENOUS AS NEEDED
Refills: 0 | Status: SHIPPED | OUTPATIENT
Start: 2025-06-18 | End: 2025-06-18

## 2025-06-18 RX ORDER — SODIUM CHLORIDE, SODIUM LACTATE, POTASSIUM CHLORIDE, CALCIUM CHLORIDE 600; 310; 30; 20 MG/100ML; MG/100ML; MG/100ML; MG/100ML
125 INJECTION, SOLUTION INTRAVENOUS ONCE
Status: COMPLETED | OUTPATIENT
Start: 2025-06-18 | End: 2025-06-18

## 2025-06-18 RX ORDER — METHYLPREDNISOLONE ACETATE 80 MG/ML
INJECTION, SUSPENSION INTRA-ARTICULAR; INTRALESIONAL; INTRAMUSCULAR; SOFT TISSUE AS NEEDED
Status: COMPLETED | OUTPATIENT
Start: 2025-06-18 | End: 2025-06-18

## 2025-06-18 RX ORDER — MIDAZOLAM HYDROCHLORIDE 1 MG/ML
1 INJECTION, SOLUTION INTRAMUSCULAR; INTRAVENOUS
Status: SHIPPED | OUTPATIENT
Start: 2025-06-18 | End: 2025-06-18

## 2025-06-18 RX ORDER — FENTANYL CITRATE 50 UG/ML
INJECTION, SOLUTION INTRAMUSCULAR; INTRAVENOUS AS NEEDED
Status: COMPLETED | OUTPATIENT
Start: 2025-06-18 | End: 2025-06-18

## 2025-06-18 RX ADMIN — Medication 2 L/MIN: at 10:27

## 2025-06-18 RX ADMIN — MIDAZOLAM 2 MG: 1 INJECTION INTRAMUSCULAR; INTRAVENOUS at 10:32

## 2025-06-18 RX ADMIN — SODIUM CHLORIDE, SODIUM LACTATE, POTASSIUM CHLORIDE, AND CALCIUM CHLORIDE 125 ML/HR: .6; .31; .03; .02 INJECTION, SOLUTION INTRAVENOUS at 10:00

## 2025-06-18 RX ADMIN — Medication 2 L/MIN: at 10:20

## 2025-06-18 RX ADMIN — IOHEXOL 3 ML: 240 INJECTION, SOLUTION INTRATHECAL; INTRAVASCULAR; INTRAVENOUS; ORAL at 10:33

## 2025-06-18 RX ADMIN — FENTANYL CITRATE 100 MCG: 50 INJECTION, SOLUTION INTRAMUSCULAR; INTRAVENOUS at 10:32

## 2025-06-18 RX ADMIN — METHYLPREDNISOLONE ACETATE 80 MG: 80 INJECTION, SUSPENSION INTRA-ARTICULAR; INTRALESIONAL; INTRAMUSCULAR; SOFT TISSUE at 10:33

## 2025-06-18 ASSESSMENT — PAIN SCALES - GENERAL
PAINLEVEL_OUTOF10: 0 - NO PAIN
PAINLEVEL_OUTOF10: 8
PAINLEVEL_OUTOF10: 0 - NO PAIN

## 2025-06-18 ASSESSMENT — PAIN DESCRIPTION - DESCRIPTORS: DESCRIPTORS: ACHING

## 2025-06-18 ASSESSMENT — PAIN - FUNCTIONAL ASSESSMENT
PAIN_FUNCTIONAL_ASSESSMENT: 0-10

## 2025-06-18 NOTE — NURSING NOTE
1043: Received Patient from procedure room into phase I recovery calm and O x 4, VSS. Respirations are even and nonlayborous, patient is able to maintain airway. Patient denies pain or distress. Assessment/monitor continuing.    1100: Patient is wide awake and appropriate, denies head ache or back pain post procedure.  Will move over to Phase II recovery.

## 2025-06-18 NOTE — DISCHARGE INSTRUCTIONS
Post injection home going instructions reviewed, Patient verbalized understanding. Will wheel patient out to waiting vehicle

## 2025-07-17 ENCOUNTER — HOSPITAL ENCOUNTER (OUTPATIENT)
Dept: PAIN MEDICINE | Facility: CLINIC | Age: 70
Discharge: HOME | End: 2025-07-17
Payer: MEDICARE

## 2025-07-17 VITALS
HEART RATE: 57 BPM | SYSTOLIC BLOOD PRESSURE: 118 MMHG | RESPIRATION RATE: 16 BRPM | HEIGHT: 72 IN | OXYGEN SATURATION: 95 % | WEIGHT: 200 LBS | DIASTOLIC BLOOD PRESSURE: 73 MMHG | TEMPERATURE: 97.5 F | BODY MASS INDEX: 27.09 KG/M2

## 2025-07-17 DIAGNOSIS — M54.17 LUMBOSACRAL RADICULOPATHY AT L5: Primary | ICD-10-CM

## 2025-07-17 PROCEDURE — 2500000004 HC RX 250 GENERAL PHARMACY W/ HCPCS (ALT 636 FOR OP/ED): Performed by: ANESTHESIOLOGY

## 2025-07-17 PROCEDURE — 2550000001 HC RX 255 CONTRASTS: Performed by: ANESTHESIOLOGY

## 2025-07-17 PROCEDURE — 2500000005 HC RX 250 GENERAL PHARMACY W/O HCPCS: Performed by: ANESTHESIOLOGY

## 2025-07-17 PROCEDURE — 99152 MOD SED SAME PHYS/QHP 5/>YRS: CPT | Performed by: ANESTHESIOLOGY

## 2025-07-17 RX ORDER — MIDAZOLAM HYDROCHLORIDE 1 MG/ML
INJECTION, SOLUTION INTRAMUSCULAR; INTRAVENOUS AS NEEDED
Status: COMPLETED | OUTPATIENT
Start: 2025-07-17 | End: 2025-07-17

## 2025-07-17 RX ORDER — MIDAZOLAM HYDROCHLORIDE 1 MG/ML
1 INJECTION, SOLUTION INTRAMUSCULAR; INTRAVENOUS
Status: SHIPPED | OUTPATIENT
Start: 2025-07-17 | End: 2025-07-17

## 2025-07-17 RX ORDER — SODIUM CHLORIDE 9 MG/ML
125 INJECTION, SOLUTION INTRAVENOUS ONCE
Status: COMPLETED | OUTPATIENT
Start: 2025-07-17 | End: 2025-07-17

## 2025-07-17 RX ORDER — FENTANYL CITRATE 50 UG/ML
INJECTION, SOLUTION INTRAMUSCULAR; INTRAVENOUS AS NEEDED
Status: COMPLETED | OUTPATIENT
Start: 2025-07-17 | End: 2025-07-17

## 2025-07-17 RX ORDER — FENTANYL CITRATE 50 UG/ML
50 INJECTION, SOLUTION INTRAMUSCULAR; INTRAVENOUS AS NEEDED
Refills: 0 | Status: SHIPPED | OUTPATIENT
Start: 2025-07-17 | End: 2025-07-17

## 2025-07-17 RX ORDER — METHYLPREDNISOLONE ACETATE 80 MG/ML
INJECTION, SUSPENSION INTRA-ARTICULAR; INTRALESIONAL; INTRAMUSCULAR; SOFT TISSUE AS NEEDED
Status: COMPLETED | OUTPATIENT
Start: 2025-07-17 | End: 2025-07-17

## 2025-07-17 RX ADMIN — SODIUM CHLORIDE 125 ML/HR: 0.9 INJECTION, SOLUTION INTRAVENOUS at 08:00

## 2025-07-17 RX ADMIN — MIDAZOLAM 2 MG: 1 INJECTION INTRAMUSCULAR; INTRAVENOUS at 08:26

## 2025-07-17 RX ADMIN — IOHEXOL 3 ML: 240 INJECTION, SOLUTION INTRATHECAL; INTRAVASCULAR; INTRAVENOUS; ORAL at 08:28

## 2025-07-17 RX ADMIN — METHYLPREDNISOLONE ACETATE 80 MG: 80 INJECTION, SUSPENSION INTRA-ARTICULAR; INTRALESIONAL; INTRAMUSCULAR; SOFT TISSUE at 08:28

## 2025-07-17 RX ADMIN — FENTANYL CITRATE 100 MCG: 50 INJECTION, SOLUTION INTRAMUSCULAR; INTRAVENOUS at 08:27

## 2025-07-17 RX ADMIN — Medication 2 L/MIN: at 08:22

## 2025-07-17 ASSESSMENT — PAIN - FUNCTIONAL ASSESSMENT
PAIN_FUNCTIONAL_ASSESSMENT: 0-10

## 2025-07-17 ASSESSMENT — PAIN SCALES - GENERAL
PAINLEVEL_OUTOF10: 0 - NO PAIN
PAINLEVEL_OUTOF10: 4
PAINLEVEL_OUTOF10: 0 - NO PAIN

## 2025-07-17 ASSESSMENT — PAIN DESCRIPTION - DESCRIPTORS: DESCRIPTORS: ACHING;SORE

## 2025-07-17 NOTE — H&P
History Of Present Illness  Chuy Salazar is a 70 y.o. male presenting with lumbosacral radiculopathy here today for his third lumbar JEREL with fluoroscopy.  Patient requests IV conscious sedation     Past Medical History  Medical History[1]    Surgical History  Surgical History[2]     Social History  He reports that he has never smoked. He has never used smokeless tobacco. He reports that he does not currently use alcohol. He reports that he does not currently use drugs.    Family History  Family History[3]     Allergies  Patient has no known allergies.    Review of Systems     Physical Exam     Last Recorded Vitals  Blood pressure 141/88, pulse 63, temperature 36.4 °C (97.5 °F), temperature source Skin, resp. rate 18, height 1.829 m (6'), weight 90.7 kg (200 lb), SpO2 95%.    Relevant Results       Assessment & Plan  Lumbosacral radiculopathy at L5      There is been no changes to patient's medical history since his last visit.    I spent 10 minutes in the professional and overall care of this patient.      Nghia Armas DO         [1]   Past Medical History:  Diagnosis Date    Abdominal injury 2014    Concussion 2014    Low back pain     TBI (traumatic brain injury) (Multi)     multiple during youth boxing   [2]   Past Surgical History:  Procedure Laterality Date    OTHER SURGICAL HISTORY  01/20/2016    Laparoscopy Repair Of Initial Inguinal Hernia    SHOULDER SURGERY  01/19/2015    Shoulder Surgery    VASECTOMY  01/04/2016    Surgery Vas Deferens Vasectomy   [3]   Family History  Problem Relation Name Age of Onset    Colon cancer Father      Other (cardiac disorder) Mother

## 2025-08-28 ENCOUNTER — OFFICE VISIT (OUTPATIENT)
Dept: PAIN MEDICINE | Facility: CLINIC | Age: 70
End: 2025-08-28
Payer: MEDICARE

## 2025-08-28 VITALS
TEMPERATURE: 97.5 F | HEIGHT: 72 IN | BODY MASS INDEX: 27.09 KG/M2 | OXYGEN SATURATION: 96 % | RESPIRATION RATE: 18 BRPM | SYSTOLIC BLOOD PRESSURE: 152 MMHG | HEART RATE: 57 BPM | WEIGHT: 200 LBS | DIASTOLIC BLOOD PRESSURE: 72 MMHG

## 2025-08-28 DIAGNOSIS — M54.17 LUMBOSACRAL RADICULOPATHY AT L5: ICD-10-CM

## 2025-08-28 DIAGNOSIS — S32.030D COMPRESSION FRACTURE OF L3 VERTEBRA WITH ROUTINE HEALING: Primary | ICD-10-CM

## 2025-08-28 PROCEDURE — 1036F TOBACCO NON-USER: CPT | Performed by: ANESTHESIOLOGY

## 2025-08-28 PROCEDURE — 3078F DIAST BP <80 MM HG: CPT | Performed by: ANESTHESIOLOGY

## 2025-08-28 PROCEDURE — 1125F AMNT PAIN NOTED PAIN PRSNT: CPT | Performed by: ANESTHESIOLOGY

## 2025-08-28 PROCEDURE — 99212 OFFICE O/P EST SF 10 MIN: CPT

## 2025-08-28 PROCEDURE — 99213 OFFICE O/P EST LOW 20 MIN: CPT | Performed by: ANESTHESIOLOGY

## 2025-08-28 PROCEDURE — 1160F RVW MEDS BY RX/DR IN RCRD: CPT | Performed by: ANESTHESIOLOGY

## 2025-08-28 PROCEDURE — 3077F SYST BP >= 140 MM HG: CPT | Performed by: ANESTHESIOLOGY

## 2025-08-28 PROCEDURE — 1159F MED LIST DOCD IN RCRD: CPT | Performed by: ANESTHESIOLOGY

## 2025-08-28 PROCEDURE — 3008F BODY MASS INDEX DOCD: CPT | Performed by: ANESTHESIOLOGY

## 2025-08-28 RX ORDER — GABAPENTIN 300 MG/1
300 CAPSULE ORAL 3 TIMES DAILY
Qty: 270 CAPSULE | Refills: 1 | Status: SHIPPED | OUTPATIENT
Start: 2025-08-28 | End: 2025-11-26

## 2025-08-28 ASSESSMENT — PAIN DESCRIPTION - DESCRIPTORS: DESCRIPTORS: ACHING;SORE

## 2025-08-28 ASSESSMENT — PAIN - FUNCTIONAL ASSESSMENT: PAIN_FUNCTIONAL_ASSESSMENT: 0-10

## 2025-08-28 ASSESSMENT — LIFESTYLE VARIABLES: TOTAL SCORE: 0

## 2025-08-28 ASSESSMENT — ENCOUNTER SYMPTOMS
OCCASIONAL FEELINGS OF UNSTEADINESS: 0
LOSS OF SENSATION IN FEET: 0
DEPRESSION: 0

## 2025-08-28 ASSESSMENT — PAIN SCALES - GENERAL
PAINLEVEL_OUTOF10: 4
PAINLEVEL_OUTOF10: 4

## 2025-09-03 ENCOUNTER — APPOINTMENT (OUTPATIENT)
Dept: PRIMARY CARE | Facility: CLINIC | Age: 70
End: 2025-09-03
Payer: MEDICARE

## 2025-09-03 PROBLEM — D12.6 ADENOMATOUS POLYP OF COLON: Status: ACTIVE | Noted: 2025-09-03

## 2025-09-03 ASSESSMENT — PATIENT HEALTH QUESTIONNAIRE - PHQ9
1. LITTLE INTEREST OR PLEASURE IN DOING THINGS: NOT AT ALL
SUM OF ALL RESPONSES TO PHQ9 QUESTIONS 1 AND 2: 0
2. FEELING DOWN, DEPRESSED OR HOPELESS: NOT AT ALL

## 2025-09-03 ASSESSMENT — ACTIVITIES OF DAILY LIVING (ADL)
GROCERY_SHOPPING: INDEPENDENT
DOING_HOUSEWORK: INDEPENDENT
MANAGING_FINANCES: INDEPENDENT
TAKING_MEDICATION: INDEPENDENT
DRESSING: INDEPENDENT
BATHING: INDEPENDENT

## 2026-03-03 ENCOUNTER — APPOINTMENT (OUTPATIENT)
Dept: PRIMARY CARE | Facility: CLINIC | Age: 71
End: 2026-03-03
Payer: MEDICARE

## 2026-09-08 ENCOUNTER — APPOINTMENT (OUTPATIENT)
Dept: PRIMARY CARE | Facility: CLINIC | Age: 71
End: 2026-09-08
Payer: MEDICARE